# Patient Record
Sex: MALE | Race: WHITE | NOT HISPANIC OR LATINO | Employment: OTHER | URBAN - METROPOLITAN AREA
[De-identification: names, ages, dates, MRNs, and addresses within clinical notes are randomized per-mention and may not be internally consistent; named-entity substitution may affect disease eponyms.]

---

## 2021-02-24 ENCOUNTER — OFFICE VISIT (OUTPATIENT)
Dept: FAMILY MEDICINE | Facility: CLINIC | Age: 56
End: 2021-02-24
Payer: COMMERCIAL

## 2021-02-24 VITALS
WEIGHT: 222.6 LBS | HEIGHT: 71 IN | DIASTOLIC BLOOD PRESSURE: 102 MMHG | HEART RATE: 92 BPM | SYSTOLIC BLOOD PRESSURE: 164 MMHG | BODY MASS INDEX: 31.16 KG/M2 | TEMPERATURE: 98.6 F | OXYGEN SATURATION: 98 %

## 2021-02-24 DIAGNOSIS — M54.50 LUMBAR BACK PAIN: Primary | ICD-10-CM

## 2021-02-24 PROCEDURE — 99203 OFFICE O/P NEW LOW 30 MIN: CPT | Performed by: FAMILY MEDICINE

## 2021-02-24 ASSESSMENT — MIFFLIN-ST. JEOR: SCORE: 1871.84

## 2021-02-24 ASSESSMENT — PAIN SCALES - GENERAL: PAINLEVEL: SEVERE PAIN (6)

## 2021-02-24 NOTE — PATIENT INSTRUCTIONS
Our Clinic hours are:  Mondays    7:20 am - 7 pm  Tues -  Fri  7:20 am - 5 pm    Clinic Phone: [phone]    The clinic lab opens at 7:30 am Mon - Fri and appointments are required.    [facility]. [phone]  Monday  8 am - 7pm  Tues - Fri 8 am - 5:30 pm

## 2021-02-24 NOTE — PROGRESS NOTES
"    Assessment & Plan     Lumbar back pain  Given pathology and previous MRI and persistent/progression of symptoms we will repeat MRI for further evaluation and management.  Treatment recommendations will be dependent, in large part, on MRI results.  He is hoping to possibly get to disability for his back issues.  Discussed that in order to really pursue that further he would need to show maximal medical improvement and we are only just beginning early work-up for this.  - MR Lumbar Spine w/o Contrast; Future             Tobacco Cessation:   reports that he has been smoking cigarettes. He has been smoking about 1.50 packs per day. He has never used smokeless tobacco.      BMI:   Estimated body mass index is 31.05 kg/m  as calculated from the following:    Height as of this encounter: 1.803 m (5' 11\").    Weight as of this encounter: 101 kg (222 lb 9.6 oz).   Weight management plan: See AVS        No follow-ups on file.    Hernan Connell MD  [facility]    Keith Galan is a 55 year old who presents for the following health issues     HPI       Back Pain  Onset/Duration: years  Description:   Location of pain: middle of back bilateral  Character of pain: sharp, dull ache and constant  Pain radiation: none  New numbness or weakness in legs, not attributed to pain: no   Intensity: Currently 6/10  Progression of Symptoms: same  History:   Specific cause: none  Pain interferes with job: not applicable  History of back problems: Has RA in it  Any previous MRI or X-rays: Yes- at [place].  Date 3-4 years ago, ? Done at [facility]  Sees a specialist for back pain: No  Alleviating factors:   Improved by: none    Precipitating factors:  Worsened by: Bending  Therapies tried and outcome: none    Accompanying Signs & Symptoms:  Risk of Fracture: None  Risk of Cauda Equina: None  Risk of Infection: None  Risk of Cancer: None  Risk of Ankylosing Spondylitis: Onset at age <35, male, AND " "morning back stiffness  no             Review of Systems   Constitutional, neuro, ENT, endocrine, pulmonary, cardiac, gastrointestinal, genitourinary, musculoskeletal, integument and psychiatric systems are negative, except as otherwise noted.       Objective    BP (!) 164/102 (Cuff Size: Adult Regular)   Pulse 92   Temp 98.6  F (37  C) (Tympanic)   Ht 1.803 m (5' 11\")   Wt 101 kg (222 lb 9.6 oz)   SpO2 98%   BMI 31.05 kg/m    Body mass index is 31.05 kg/m .  Physical Exam   GENERAL: Pleasant, well appearing male.  MUSCULOSKELETAL:  mild midline vertebral tenderness to palpation lower thoracic and upper lumbar vertebra. Has bilateral paravertebral tenderness and tightness. Straight leg raise is negative for radicular symptoms. Strength is 5/5 and DTR 2+ and symmetric throughout lower extremities.     RADIOLOGY REPORT    TITLE:   MR SPINE LUMBAR WO    TECHNIQUE:  Lumbar spine MRI.  Total spine sagittal T2 survey imaging,   lumbar coronal T2, 3-D HASTE, sagittal T1, T2, STIR, axial T1, T2 images   obtained.    Clinical:  Low back pain exacerbated with bending.      Findings:  Visualized cerebellum and lower brainstem appear normal.    Cervical and thoracic cord contour and signal character normal.  Conus   normal extending to the T12 level.  Shallow C3-4 disc protrusion noted on   survey imaging.  There is vertebral body edema at the anterior inferior   T11, anterior T12 and anterior superior L1 endplates with T1 signal loss.    No evidence of cortical breaks in the vertebra.  Paraspinous soft tissues   appear normal.      T11-12:  Disc desiccation.  No significant bulge or herniation.    T12-L1:  Disc space preserved.  Mild facet arthropathy.    L1-2:  Preserved disc height and hydration with no bulge or protrusion.      L2-3:  Disc desiccation with height preserved.  Annular bulge.  No canal   or foraminal stenosis.      L3-4:  Facet arthropathy.  Disc preserved.      L4-5:  Facet arthropathy/hypertrophy. "  Grade 1 anterolisthesis.  Shallow   central noneffacing herniation.  No lateral recess or foraminal stenosis.    L5-S1:  Facet arthropathy mild in degree.  Disc desiccation with height   loss.  Shallow central and left eccentric noneffacing herniation.    Foraminal patency preserved.      Impression:    1.  Evidence of significant bone bruise and contusion injuries anterior   T11, T12, L1 vertebrae and adjacent superior and inferior endplates.    2.  L4-5 anterolisthesis due to facet arthropathy with shallow noneffacing   herniation.    3.  L5-S1 left paracentral noneffacing herniation with facet arthropathy.          Dictated by: Umesh Austin Dictated on: 09/07/2017 1519   Transcribed by:   ELIJAH    Transcribed on:  9/8/17 0907

## 2021-03-12 ENCOUNTER — HOSPITAL ENCOUNTER (OUTPATIENT)
Dept: MRI IMAGING | Facility: CLINIC | Age: 56
Discharge: HOME | End: 2021-03-12
Attending: FAMILY MEDICINE | Admitting: FAMILY MEDICINE
Payer: COMMERCIAL

## 2021-03-12 DIAGNOSIS — M54.50 LUMBAR BACK PAIN: ICD-10-CM

## 2021-03-12 PROCEDURE — 72148 MRI LUMBAR SPINE W/O DYE: CPT

## 2021-11-01 ENCOUNTER — OFFICE VISIT (OUTPATIENT)
Dept: FAMILY MEDICINE | Facility: CLINIC | Age: 56
End: 2021-11-01
Payer: COMMERCIAL

## 2021-11-01 ENCOUNTER — ANCILLARY PROCEDURE (OUTPATIENT)
Dept: GENERAL RADIOLOGY | Facility: CLINIC | Age: 56
End: 2021-11-01
Attending: NURSE PRACTITIONER
Payer: COMMERCIAL

## 2021-11-01 VITALS
RESPIRATION RATE: 16 BRPM | WEIGHT: 215.8 LBS | HEIGHT: 71 IN | TEMPERATURE: 98.4 F | SYSTOLIC BLOOD PRESSURE: 138 MMHG | DIASTOLIC BLOOD PRESSURE: 88 MMHG | HEART RATE: 90 BPM | BODY MASS INDEX: 30.21 KG/M2 | OXYGEN SATURATION: 100 %

## 2021-11-01 DIAGNOSIS — M87.039 AVASCULAR NECROSIS OF LUNATE (H): ICD-10-CM

## 2021-11-01 DIAGNOSIS — M47.819 SPONDYLOARTHROPATHY: ICD-10-CM

## 2021-11-01 DIAGNOSIS — Z12.11 SCREENING FOR MALIGNANT NEOPLASM OF COLON: ICD-10-CM

## 2021-11-01 DIAGNOSIS — M45.8 ANKYLOSING SPONDYLITIS OF SACRAL REGION (H): ICD-10-CM

## 2021-11-01 DIAGNOSIS — M25.431 WRIST SWELLING, RIGHT: Primary | ICD-10-CM

## 2021-11-01 DIAGNOSIS — M25.431 WRIST SWELLING, RIGHT: ICD-10-CM

## 2021-11-01 LAB
ANION GAP SERPL CALCULATED.3IONS-SCNC: 10 MMOL/L (ref 3–14)
BUN SERPL-MCNC: 4 MG/DL (ref 7–30)
CALCIUM SERPL-MCNC: 8.6 MG/DL (ref 8.5–10.1)
CHLORIDE BLD-SCNC: 102 MMOL/L (ref 94–109)
CO2 SERPL-SCNC: 24 MMOL/L (ref 20–32)
CREAT SERPL-MCNC: 0.66 MG/DL (ref 0.66–1.25)
CRP SERPL-MCNC: <2.9 MG/L (ref 0–8)
ERYTHROCYTE [SEDIMENTATION RATE] IN BLOOD BY WESTERGREN METHOD: 23 MM/HR (ref 0–20)
GFR SERPL CREATININE-BSD FRML MDRD: >90 ML/MIN/1.73M2
GLUCOSE BLD-MCNC: 109 MG/DL (ref 70–99)
POTASSIUM BLD-SCNC: 4.4 MMOL/L (ref 3.4–5.3)
SODIUM SERPL-SCNC: 136 MMOL/L (ref 133–144)
TSH SERPL DL<=0.005 MIU/L-ACNC: 3.61 MU/L (ref 0.4–4)
URATE SERPL-MCNC: 4.9 MG/DL (ref 3.5–7.2)

## 2021-11-01 PROCEDURE — 86200 CCP ANTIBODY: CPT | Performed by: NURSE PRACTITIONER

## 2021-11-01 PROCEDURE — 84443 ASSAY THYROID STIM HORMONE: CPT | Performed by: NURSE PRACTITIONER

## 2021-11-01 PROCEDURE — 86140 C-REACTIVE PROTEIN: CPT | Performed by: NURSE PRACTITIONER

## 2021-11-01 PROCEDURE — 73110 X-RAY EXAM OF WRIST: CPT | Mod: RT | Performed by: RADIOLOGY

## 2021-11-01 PROCEDURE — 80048 BASIC METABOLIC PNL TOTAL CA: CPT | Performed by: NURSE PRACTITIONER

## 2021-11-01 PROCEDURE — 86618 LYME DISEASE ANTIBODY: CPT | Performed by: NURSE PRACTITIONER

## 2021-11-01 PROCEDURE — 36415 COLL VENOUS BLD VENIPUNCTURE: CPT | Performed by: NURSE PRACTITIONER

## 2021-11-01 PROCEDURE — 84550 ASSAY OF BLOOD/URIC ACID: CPT | Performed by: NURSE PRACTITIONER

## 2021-11-01 PROCEDURE — 99214 OFFICE O/P EST MOD 30 MIN: CPT | Performed by: NURSE PRACTITIONER

## 2021-11-01 PROCEDURE — 86431 RHEUMATOID FACTOR QUANT: CPT | Performed by: NURSE PRACTITIONER

## 2021-11-01 PROCEDURE — 85652 RBC SED RATE AUTOMATED: CPT | Performed by: NURSE PRACTITIONER

## 2021-11-01 ASSESSMENT — MIFFLIN-ST. JEOR: SCORE: 1835.99

## 2021-11-01 NOTE — PATIENT INSTRUCTIONS
Patient Education     Ankylosing Spondylitis in Adults  Arthritis is a disease that affects joints in your body. Ankylosing spondylitis (AS) is a type of arthritis that attacks the spine.   What causes AS?  Healthcare providers don t know exactly what causes AS. Genes may play a role. Many people with AS have a gene called HLA-B27. But only a few people with this gene actually get AS.  Young and old people can get AS. But it s more common in people ages 17 to 35. Men are more likely than women to have AS. You are also more likely to have it if someone else in your family had it.  Symptoms  AS causes pain and stiffness. The spine and nearby joints such as the hip become irritated (inflamed). The disease may break down the joints in serious cases. Bones may even fuse together.  Symptoms of AS may come and go. They often include:    Back pain. This often happens in the morning when waking up.    Body aches, such as in the legs, shoulders, buttocks, or heels    Stiffness in the morning    Stooped posture to ease pain    Problems breathing in deeply. This happens if AS affects the joints between the ribs and the spine.    Lack of appetite    Feeling very tired (fatigue)    Fever    Low red blood cell count (anemia)  Some people with AS also have skin rashes and stomach illnesses. They may also have eye problems. These include pain, redness, and sensitivity to light. Severe cases of the disease may damage the heart and lungs. About half of people with AS get weak and brittle bones (osteoporosis).   Diagnosing AS  To diagnose AS, your healthcare provider will start with a physical exam. He or she will ask about your symptoms and health history. X-rays of your spine and other joints may show joint damage. You may also have an MRI. Genetic testing can find out if you have the HLA-B27 gene.  Your healthcare provider may also recommend a lab test that checks for inflammation. An erythrocyte sedimentation rate test measures  how quickly red blood cells fall to the bottom of a test tube. The red blood cells will clump together and fall faster if you have inflammation from arthritis.  Treating AS  AS can t be cured. But treatments can ease pain and stiffness. They can help you live a more active life. Your healthcare provider will choose the best treatment based on your overall health and how severe the disease is.  Several types of medicine can reduce pain and inflammation. These medicines include:    Nonsteroidal anti-inflammatory drugs (NSAIDs) such as naproxen or ibuprofen    Corticosteroids. These can be injected into affected joints and areas.     Muscle relaxants    Biologic medicines    Disease-modifying antirheumatic drugs (DMARDs)  Alternative treatments may also help. Talk with your healthcare provider about acupuncture, massage, yoga, and TENS units (transcutaneous electrical nerve stimulation).  Quitting smoking may help.   Your healthcare provider may also advise surgery. For instance, you may need to have a joint replaced. Other procedures remove damaged bone or insert rods into the spine.   Exercising regularly can help ease your symptoms. Be sure to include activities that strengthen the back and increase flexibility and range of motion. Your healthcare provider may also suggest physical therapy. Maintaining correct posture is also important.  [employer] last reviewed this educational content on 6/1/2018 2000-2021 [employer]. All rights reserved. This information is not intended as a substitute for professional medical care. Always follow your healthcare professional's instructions.           Patient Education     Arthralgia    Arthralgia is the term for pain in or around the joint. It is a symptom, not a disease. This pain may involve one or more joints. In some cases, the pain moves from joint to joint.  There are many causes for joint pain. These include:    Injury    Wearing out the joint surface  (osteoarthritis)    Inflammation of the joint because of crystals in the joint fluid (gout)    Infection inside the joint      Inflammation of the fluid-filled sacs around the joint (bursitis)    Autoimmune disorders such as rheumatoid arthritis or lupus    Inflammation of chords that attach muscle to bone (tendonitis)  Home care    Rest the involved joint(s) until your symptoms improve.     Eat a healthy diet, exercise as advised by your healthcare provider and stay at a healthy weight    You may be prescribed pain medicine. If none is prescribed, you may use acetaminophen or ibuprofen to control pain and inflammation.    Follow-up care  Follow up with your healthcare provider or as advised.  When to seek medical advice  Call your healthcare provider right away if any of the following occurs:    Pain, swelling, or redness of joint increases    Pain worsens or recurs after a period of improvement    Pain moves to other joints    You cannot bear weight on the affected joint     You cannot move the affected joint    Joint appears deformed    New rash appears    Fever of 100.4 F (38 C) or higher, or as directed by your healthcare provider    New symptoms appear  Bony last reviewed this educational content on 8/1/2019 2000-2021 [employer]. All rights reserved. This information is not intended as a substitute for professional medical care. Always follow your healthcare professional's instructions.

## 2021-11-01 NOTE — PROGRESS NOTES
"  Assessment & Plan     Wrist swelling, right  ? Gout vs possible RA vs osteoarthritis  - XR Wrist Right G/E 3 Views; Future  - CRP inflammation; Future  - Cyclic Citrullinated Peptide Antibody IgG; Future  - Erythrocyte sedimentation rate auto; Future  - Lyme Disease [place] with reflex to WB Serum; Future  - Rheumatoid factor; Future  - TSH with free T4 reflex; Future  - Uric acid; Future  - Basic metabolic panel  (Ca, Cl, CO2, Creat, Gluc, K, Na, BUN); Future    Ankylosing spondylitis of sacral region (H)  Consult pain mgmt and Rheumatology  - Pain Management Referral; Future  - Rheumatology Referral; Future  - CRP inflammation; Future  - Rheumatology Referral; Future  - Basic metabolic panel  (Ca, Cl, CO2, Creat, Gluc, K, Na, BUN); Future    Screening for malignant neoplasm of colon    - Adult Gastro Ref - Procedure Only; Future    Spondyloarthropathy    - piroxicam (FELDENE) 20 MG capsule; Take 1 capsule (20 mg) by mouth daily (with breakfast)             Tobacco Cessation:   reports that he has been smoking cigarettes. He has been smoking about 1.50 packs per day. He has never used smokeless tobacco.      BMI:   Estimated body mass index is 30.1 kg/m  as calculated from the following:    Height as of this encounter: 1.803 m (5' 11\").    Weight as of this encounter: 97.9 kg (215 lb 12.8 oz).       CONSULTATION/REFERRAL to PAIN, Rheumatology    FUTURE APPOINTMENTS:       - Follow-up visit in case of new or worsening symptoms.     Patient Instructions     Patient Education     Ankylosing Spondylitis in Adults  Arthritis is a disease that affects joints in your body. Ankylosing spondylitis (AS) is a type of arthritis that attacks the spine.   What causes AS?  Healthcare providers don t know exactly what causes AS. Genes may play a role. Many people with AS have a gene called HLA-B27. But only a few people with this gene actually get AS.  Young and old people can get AS. But it s more common in people ages 17 to 35. " Men are more likely than women to have AS. You are also more likely to have it if someone else in your family had it.  Symptoms  AS causes pain and stiffness. The spine and nearby joints such as the hip become irritated (inflamed). The disease may break down the joints in serious cases. Bones may even fuse together.  Symptoms of AS may come and go. They often include:    Back pain. This often happens in the morning when waking up.    Body aches, such as in the legs, shoulders, buttocks, or heels    Stiffness in the morning    Stooped posture to ease pain    Problems breathing in deeply. This happens if AS affects the joints between the ribs and the spine.    Lack of appetite    Feeling very tired (fatigue)    Fever    Low red blood cell count (anemia)  Some people with AS also have skin rashes and stomach illnesses. They may also have eye problems. These include pain, redness, and sensitivity to light. Severe cases of the disease may damage the heart and lungs. About half of people with AS get weak and brittle bones (osteoporosis).   Diagnosing AS  To diagnose AS, your healthcare provider will start with a physical exam. He or she will ask about your symptoms and health history. X-rays of your spine and other joints may show joint damage. You may also have an MRI. Genetic testing can find out if you have the HLA-B27 gene.  Your healthcare provider may also recommend a lab test that checks for inflammation. An erythrocyte sedimentation rate test measures how quickly red blood cells fall to the bottom of a test tube. The red blood cells will clump together and fall faster if you have inflammation from arthritis.  Treating AS  AS can t be cured. But treatments can ease pain and stiffness. They can help you live a more active life. Your healthcare provider will choose the best treatment based on your overall health and how severe the disease is.  Several types of medicine can reduce pain and inflammation. These  medicines include:    Nonsteroidal anti-inflammatory drugs (NSAIDs) such as naproxen or ibuprofen    Corticosteroids. These can be injected into affected joints and areas.     Muscle relaxants    Biologic medicines    Disease-modifying antirheumatic drugs (DMARDs)  Alternative treatments may also help. Talk with your healthcare provider about acupuncture, massage, yoga, and TENS units (transcutaneous electrical nerve stimulation).  Quitting smoking may help.   Your healthcare provider may also advise surgery. For instance, you may need to have a joint replaced. Other procedures remove damaged bone or insert rods into the spine.   Exercising regularly can help ease your symptoms. Be sure to include activities that strengthen the back and increase flexibility and range of motion. Your healthcare provider may also suggest physical therapy. Maintaining correct posture is also important.  [employer] last reviewed this educational content on 6/1/2018 2000-2021 [employer]. All rights reserved. This information is not intended as a substitute for professional medical care. Always follow your healthcare professional's instructions.           Patient Education     Arthralgia    Arthralgia is the term for pain in or around the joint. It is a symptom, not a disease. This pain may involve one or more joints. In some cases, the pain moves from joint to joint.  There are many causes for joint pain. These include:    Injury    Wearing out the joint surface (osteoarthritis)    Inflammation of the joint because of crystals in the joint fluid (gout)    Infection inside the joint      Inflammation of the fluid-filled sacs around the joint (bursitis)    Autoimmune disorders such as rheumatoid arthritis or lupus    Inflammation of chords that attach muscle to bone (tendonitis)  Home care    Rest the involved joint(s) until your symptoms improve.     Eat a healthy diet, exercise as advised by your healthcare provider and  stay at a healthy weight    You may be prescribed pain medicine. If none is prescribed, you may use acetaminophen or ibuprofen to control pain and inflammation.    Follow-up care  Follow up with your healthcare provider or as advised.  When to seek medical advice  Call your healthcare provider right away if any of the following occurs:    Pain, swelling, or redness of joint increases    Pain worsens or recurs after a period of improvement    Pain moves to other joints    You cannot bear weight on the affected joint     You cannot move the affected joint    Joint appears deformed    New rash appears    Fever of 100.4 F (38 C) or higher, or as directed by your healthcare provider    New symptoms appear  B[employer] last reviewed this educational content on 8/1/2019 2000-2021 [employer]. All rights reserved. This information is not intended as a substitute for professional medical care. Always follow your healthcare professional's instructions.               No follow-ups on file.    LILLIAM Swartz CNP  [facility]    Keith Galan is a 56 year old who presents for the following health issues     HPI     Musculoskeletal problem/pain  Onset/Duration: 4 years   Description  Location: wrist - right  Joint Swelling: YES  Redness: no  Pain: YES- aches   Warmth: no  Intensity:  mild  Progression of Symptoms:  same  Accompanying signs and symptoms:   Fevers: no  Numbness/tingling/weakness: no  History  Trauma to the area: YES- stripped a floor with carpal tunnel about 4 years ago, pain ever since   Recent illness:  no  Previous similar problem: no  Previous evaluation:  YES- Hx of ankylosing spondylitis- seen on MRI scans and also HLA- B27 positive test. Has been evaluated by Rheumatology but cannot afford the medications they wanted to put him on.   Precipitating or alleviating factors:  Aggravating factors include: lifting and overuse  Therapies tried and outcome:  "nothing    Back Pain  Onset/Duration: dx with ankylosing spondylitis about 10 years ago   Description:   Location of pain: middle of back center  Character of pain: dull ache  Pain radiation: none  New numbness or weakness in legs, not attributed to pain: no   Intensity: moderate  Progression of Symptoms: worsening  History:   Specific cause: none  Pain interferes with job: not applicable  History of back problems: previous herniated disc  Any previous MRI or X-rays: Yes- at [place].  Date 3/12/21  Sees a specialist for back pain: No  Hx of ankylosing spondylitis- seen on MRI scans and also HLA- B27 positive test. Has been evaluated by Rheumatology but cannot afford the medications they wanted to put him on.   Alleviating factors:   Improved by: none    Precipitating factors:  Worsened by: Lifting, Bending, Sitting and climbing stairs   Therapies tried and outcome: aleve     Accompanying Signs & Symptoms:  Risk of Fracture: None  Risk of Cauda Equina: None  Risk of Infection: None  Risk of Cancer: None  Risk of Ankylosing Spondylitis: Onset at age <35, male, AND morning back stiffness YES    [id number]}    Review of Systems   Constitutional, HEENT, cardiovascular, pulmonary, gi and gu systems are negative, except as otherwise noted.      Objective    /88   Pulse 90   Temp 98.4  F (36.9  C) (Tympanic)   Resp 16   Ht 1.803 m (5' 11\")   Wt 97.9 kg (215 lb 12.8 oz)   SpO2 100%   BMI 30.10 kg/m    Body mass index is 30.1 kg/m .  Physical Exam   GENERAL: alert and no distress  RESP: no rales  and no rhonchi  CV: regular rates and rhythm, normal S1 S2, no S3 or S4 and no murmur, click or rub  MS: LUE exam shows normal strength and muscle mass, no deformities, no erythema, induration, or nodules, no evidence of joint effusion, ROM of all joints is normal and mild edema and erythema to right wrist- no warmth or lesions.  SKIN: no suspicious lesions or rashes  NEURO: Normal strength and tone, mentation intact and " speech normal    Xray - Reviewed and interpreted by me.  Healed old fracture, some DJD of wrist

## 2021-11-02 LAB
B BURGDOR IGG+IGM SER QL: 0.02
CCP AB SER IA-ACNC: 2.3 U/ML
RHEUMATOID FACT SER NEPH-ACNC: 11 IU/ML

## 2021-11-08 ENCOUNTER — TELEPHONE (OUTPATIENT)
Dept: RHEUMATOLOGY | Facility: CLINIC | Age: 56
End: 2021-11-08
Payer: COMMERCIAL

## 2021-11-08 NOTE — TELEPHONE ENCOUNTER
Health Call Center    Phone Message    May a detailed message be left on voicemail: no; No phone number listed in chart, Pt'spouse states she will call back to update chart with the phone number in chart.    Reason for Call: Appointment Intake    Referring Provider Name: Kimberly Marshall APRN CNP in  FAMILY PRACTICE    Diagnosis and/or Symptoms: Ankylosing spondylitis of sacral region (H) [M45.8]    Pt was referred to be seen by Dr. Lang.  Please review and advise if okay to be schedule with Dr. Lang?    Action Taken: Message routed to:  [facility] Adult Rheumatology

## 2021-11-09 NOTE — TELEPHONE ENCOUNTER
Pt was scheduled with Dr. Rodriguez for 2/10/22.     Please disregard the message for Dr. Lang's team to review and advise.

## 2022-03-11 ENCOUNTER — TELEPHONE (OUTPATIENT)
Dept: FAMILY MEDICINE | Facility: CLINIC | Age: 57
End: 2022-03-11
Payer: COMMERCIAL

## 2022-03-11 NOTE — TELEPHONE ENCOUNTER
Patient Quality Outreach    Patient is due for the following:   Colon Cancer Screening -  FIT, Colonoscopy and Cologuard    NEXT STEPS:   patient to schedule colon screening    Type of outreach:    Phone, left message for patient/parent to call back.      Questions for provider review:    None     Clara Ching MA

## 2024-03-26 ENCOUNTER — APPOINTMENT (OUTPATIENT)
Dept: CT IMAGING | Facility: CLINIC | Age: 59
End: 2024-03-26
Attending: EMERGENCY MEDICINE
Payer: COMMERCIAL

## 2024-03-26 ENCOUNTER — APPOINTMENT (OUTPATIENT)
Dept: MRI IMAGING | Facility: CLINIC | Age: 59
End: 2024-03-26
Attending: EMERGENCY MEDICINE
Payer: COMMERCIAL

## 2024-03-26 ENCOUNTER — HOSPITAL ENCOUNTER (EMERGENCY)
Facility: CLINIC | Age: 59
Discharge: ANOTHER HOSPITAL | End: 2024-03-27
Attending: EMERGENCY MEDICINE | Admitting: EMERGENCY MEDICINE
Payer: COMMERCIAL

## 2024-03-26 DIAGNOSIS — S09.90XA CLOSED HEAD INJURY, INITIAL ENCOUNTER: ICD-10-CM

## 2024-03-26 DIAGNOSIS — S01.01XA SCALP LACERATION, INITIAL ENCOUNTER: ICD-10-CM

## 2024-03-26 DIAGNOSIS — E87.1 HYPONATREMIA: ICD-10-CM

## 2024-03-26 DIAGNOSIS — R56.9 SEIZURE-LIKE ACTIVITY (H): Primary | ICD-10-CM

## 2024-03-26 PROBLEM — I10 HYPERTENSION: Status: ACTIVE | Noted: 2017-06-06

## 2024-03-26 PROBLEM — G56.03 BILATERAL CARPAL TUNNEL SYNDROME: Status: ACTIVE | Noted: 2017-05-01

## 2024-03-26 LAB
ALBUMIN SERPL BCG-MCNC: 3.7 G/DL (ref 3.5–5.2)
ALP SERPL-CCNC: 129 U/L (ref 40–150)
ALT SERPL W P-5'-P-CCNC: 27 U/L (ref 0–70)
ANION GAP SERPL CALCULATED.3IONS-SCNC: 18 MMOL/L (ref 7–15)
AST SERPL W P-5'-P-CCNC: 37 U/L (ref 0–45)
BASOPHILS # BLD MANUAL: 0.1 10E3/UL (ref 0–0.2)
BASOPHILS NFR BLD MANUAL: 1 %
BILIRUB SERPL-MCNC: 0.5 MG/DL
BUN SERPL-MCNC: 5.6 MG/DL (ref 6–20)
CALCIUM SERPL-MCNC: 8.6 MG/DL (ref 8.6–10)
CHLORIDE SERPL-SCNC: 92 MMOL/L (ref 98–107)
CREAT SERPL-MCNC: 0.76 MG/DL (ref 0.67–1.17)
DEPRECATED HCO3 PLAS-SCNC: 20 MMOL/L (ref 22–29)
EGFRCR SERPLBLD CKD-EPI 2021: >90 ML/MIN/1.73M2
EOSINOPHIL # BLD MANUAL: 0 10E3/UL (ref 0–0.7)
EOSINOPHIL NFR BLD MANUAL: 0 %
ERYTHROCYTE [DISTWIDTH] IN BLOOD BY AUTOMATED COUNT: 15.9 % (ref 10–15)
ETHANOL SERPL-MCNC: <0.01 G/DL
GLUCOSE SERPL-MCNC: 144 MG/DL (ref 70–99)
HCT VFR BLD AUTO: 46.8 % (ref 40–53)
HGB BLD-MCNC: 15.9 G/DL (ref 13.3–17.7)
HOLD SPECIMEN: NORMAL
LACTATE SERPL-SCNC: 1.9 MMOL/L (ref 0.7–2)
LACTATE SERPL-SCNC: 4.8 MMOL/L (ref 0.7–2)
LYMPHOCYTES # BLD MANUAL: 1 10E3/UL (ref 0.8–5.3)
LYMPHOCYTES NFR BLD MANUAL: 14 %
MAGNESIUM SERPL-MCNC: 2.2 MG/DL (ref 1.7–2.3)
MAGNESIUM SERPL-MCNC: 2.4 MG/DL (ref 1.7–2.3)
MCH RBC QN AUTO: 29.3 PG (ref 26.5–33)
MCHC RBC AUTO-ENTMCNC: 34 G/DL (ref 31.5–36.5)
MCV RBC AUTO: 86 FL (ref 78–100)
MONOCYTES # BLD MANUAL: 0.6 10E3/UL (ref 0–1.3)
MONOCYTES NFR BLD MANUAL: 8 %
NEUTROPHILS # BLD MANUAL: 5.7 10E3/UL (ref 1.6–8.3)
NEUTROPHILS NFR BLD MANUAL: 77 %
NRBC # BLD AUTO: 0 10E3/UL
NRBC BLD AUTO-RTO: 0 /100
PHOSPHATE SERPL-MCNC: 2.2 MG/DL (ref 2.5–4.5)
PLAT MORPH BLD: NORMAL
PLATELET # BLD AUTO: 217 10E3/UL (ref 150–450)
POTASSIUM SERPL-SCNC: 3.6 MMOL/L (ref 3.4–5.3)
PROT SERPL-MCNC: 8.1 G/DL (ref 6.4–8.3)
RBC # BLD AUTO: 5.43 10E6/UL (ref 4.4–5.9)
RBC MORPH BLD: NORMAL
SODIUM SERPL-SCNC: 130 MMOL/L (ref 135–145)
TROPONIN T SERPL HS-MCNC: 12 NG/L
WBC # BLD AUTO: 7.4 10E3/UL (ref 4–11)

## 2024-03-26 PROCEDURE — 99285 EMERGENCY DEPT VISIT HI MDM: CPT | Mod: 25

## 2024-03-26 PROCEDURE — 12001 RPR S/N/AX/GEN/TRNK 2.5CM/<: CPT | Performed by: EMERGENCY MEDICINE

## 2024-03-26 PROCEDURE — 258N000003 HC RX IP 258 OP 636: Performed by: EMERGENCY MEDICINE

## 2024-03-26 PROCEDURE — 83735 ASSAY OF MAGNESIUM: CPT | Mod: 91 | Performed by: EMERGENCY MEDICINE

## 2024-03-26 PROCEDURE — 82077 ASSAY SPEC XCP UR&BREATH IA: CPT | Performed by: EMERGENCY MEDICINE

## 2024-03-26 PROCEDURE — 99285 EMERGENCY DEPT VISIT HI MDM: CPT | Mod: 25 | Performed by: EMERGENCY MEDICINE

## 2024-03-26 PROCEDURE — 250N000013 HC RX MED GY IP 250 OP 250 PS 637: Performed by: EMERGENCY MEDICINE

## 2024-03-26 PROCEDURE — 84484 ASSAY OF TROPONIN QUANT: CPT | Performed by: EMERGENCY MEDICINE

## 2024-03-26 PROCEDURE — A9585 GADOBUTROL INJECTION: HCPCS | Performed by: EMERGENCY MEDICINE

## 2024-03-26 PROCEDURE — 255N000002 HC RX 255 OP 636: Performed by: EMERGENCY MEDICINE

## 2024-03-26 PROCEDURE — 85049 AUTOMATED PLATELET COUNT: CPT | Performed by: EMERGENCY MEDICINE

## 2024-03-26 PROCEDURE — 70450 CT HEAD/BRAIN W/O DYE: CPT

## 2024-03-26 PROCEDURE — 96361 HYDRATE IV INFUSION ADD-ON: CPT | Mod: 59 | Performed by: EMERGENCY MEDICINE

## 2024-03-26 PROCEDURE — 84100 ASSAY OF PHOSPHORUS: CPT | Performed by: EMERGENCY MEDICINE

## 2024-03-26 PROCEDURE — 36415 COLL VENOUS BLD VENIPUNCTURE: CPT | Performed by: EMERGENCY MEDICINE

## 2024-03-26 PROCEDURE — 93010 ELECTROCARDIOGRAM REPORT: CPT | Performed by: EMERGENCY MEDICINE

## 2024-03-26 PROCEDURE — 96360 HYDRATION IV INFUSION INIT: CPT | Mod: 59 | Performed by: EMERGENCY MEDICINE

## 2024-03-26 PROCEDURE — 72125 CT NECK SPINE W/O DYE: CPT

## 2024-03-26 PROCEDURE — 85007 BL SMEAR W/DIFF WBC COUNT: CPT | Performed by: EMERGENCY MEDICINE

## 2024-03-26 PROCEDURE — 83735 ASSAY OF MAGNESIUM: CPT | Performed by: EMERGENCY MEDICINE

## 2024-03-26 PROCEDURE — 12001 RPR S/N/AX/GEN/TRNK 2.5CM/<: CPT

## 2024-03-26 PROCEDURE — 70553 MRI BRAIN STEM W/O & W/DYE: CPT

## 2024-03-26 PROCEDURE — 93005 ELECTROCARDIOGRAM TRACING: CPT | Mod: XU | Performed by: EMERGENCY MEDICINE

## 2024-03-26 PROCEDURE — 83605 ASSAY OF LACTIC ACID: CPT | Mod: 91 | Performed by: EMERGENCY MEDICINE

## 2024-03-26 PROCEDURE — 80053 COMPREHEN METABOLIC PANEL: CPT | Performed by: EMERGENCY MEDICINE

## 2024-03-26 RX ORDER — LEVETIRACETAM 500 MG/1
500 TABLET ORAL 2 TIMES DAILY
Qty: 60 TABLET | Refills: 0 | Status: ON HOLD | OUTPATIENT
Start: 2024-03-26 | End: 2024-03-27

## 2024-03-26 RX ORDER — GABAPENTIN 300 MG/1
600 CAPSULE ORAL EVERY 8 HOURS
Status: DISCONTINUED | OUTPATIENT
Start: 2024-03-30 | End: 2024-03-27 | Stop reason: HOSPADM

## 2024-03-26 RX ORDER — OLANZAPINE 5 MG/1
5-10 TABLET, ORALLY DISINTEGRATING ORAL EVERY 6 HOURS PRN
Status: DISCONTINUED | OUTPATIENT
Start: 2024-03-26 | End: 2024-03-27 | Stop reason: HOSPADM

## 2024-03-26 RX ORDER — IBUPROFEN 600 MG/1
600 TABLET, FILM COATED ORAL ONCE
Status: COMPLETED | OUTPATIENT
Start: 2024-03-26 | End: 2024-03-26

## 2024-03-26 RX ORDER — GADOBUTROL 604.72 MG/ML
9 INJECTION INTRAVENOUS ONCE
Status: COMPLETED | OUTPATIENT
Start: 2024-03-26 | End: 2024-03-26

## 2024-03-26 RX ORDER — FOLIC ACID 1 MG/1
1 TABLET ORAL DAILY
Status: DISCONTINUED | OUTPATIENT
Start: 2024-03-27 | End: 2024-03-26

## 2024-03-26 RX ORDER — GABAPENTIN 300 MG/1
300 CAPSULE ORAL EVERY 8 HOURS
Status: DISCONTINUED | OUTPATIENT
Start: 2024-04-01 | End: 2024-03-27 | Stop reason: HOSPADM

## 2024-03-26 RX ORDER — GABAPENTIN 300 MG/1
900 CAPSULE ORAL EVERY 8 HOURS
Status: DISCONTINUED | OUTPATIENT
Start: 2024-03-27 | End: 2024-03-27 | Stop reason: HOSPADM

## 2024-03-26 RX ORDER — GABAPENTIN 600 MG/1
1200 TABLET ORAL ONCE
Status: COMPLETED | OUTPATIENT
Start: 2024-03-26 | End: 2024-03-26

## 2024-03-26 RX ORDER — FOLIC ACID 1 MG/1
1 TABLET ORAL DAILY
Status: DISCONTINUED | OUTPATIENT
Start: 2024-03-26 | End: 2024-03-27 | Stop reason: HOSPADM

## 2024-03-26 RX ORDER — FLUMAZENIL 0.1 MG/ML
0.2 INJECTION, SOLUTION INTRAVENOUS
Status: DISCONTINUED | OUTPATIENT
Start: 2024-03-26 | End: 2024-03-27 | Stop reason: HOSPADM

## 2024-03-26 RX ORDER — MULTIVITAMIN,THERAPEUTIC
1 TABLET ORAL DAILY
Status: DISCONTINUED | OUTPATIENT
Start: 2024-03-26 | End: 2024-03-27 | Stop reason: HOSPADM

## 2024-03-26 RX ORDER — GABAPENTIN 100 MG/1
100 CAPSULE ORAL EVERY 8 HOURS
Status: DISCONTINUED | OUTPATIENT
Start: 2024-04-03 | End: 2024-03-27 | Stop reason: HOSPADM

## 2024-03-26 RX ORDER — CLONIDINE HYDROCHLORIDE 0.1 MG/1
0.1 TABLET ORAL EVERY 8 HOURS
Status: DISCONTINUED | OUTPATIENT
Start: 2024-03-26 | End: 2024-03-27 | Stop reason: HOSPADM

## 2024-03-26 RX ORDER — LORAZEPAM 2 MG/ML
1-2 INJECTION INTRAMUSCULAR EVERY 30 MIN PRN
Status: DISCONTINUED | OUTPATIENT
Start: 2024-03-26 | End: 2024-03-27 | Stop reason: HOSPADM

## 2024-03-26 RX ORDER — MULTIPLE VITAMINS W/ MINERALS TAB 9MG-400MCG
1 TAB ORAL DAILY
Status: DISCONTINUED | OUTPATIENT
Start: 2024-03-27 | End: 2024-03-26

## 2024-03-26 RX ORDER — LORAZEPAM 1 MG/1
1-2 TABLET ORAL EVERY 30 MIN PRN
Status: DISCONTINUED | OUTPATIENT
Start: 2024-03-26 | End: 2024-03-27 | Stop reason: HOSPADM

## 2024-03-26 RX ORDER — HALOPERIDOL 5 MG/ML
2.5-5 INJECTION INTRAMUSCULAR EVERY 6 HOURS PRN
Status: DISCONTINUED | OUTPATIENT
Start: 2024-03-26 | End: 2024-03-27 | Stop reason: HOSPADM

## 2024-03-26 RX ADMIN — CLONIDINE HYDROCHLORIDE 0.1 MG: 0.1 TABLET ORAL at 23:23

## 2024-03-26 RX ADMIN — GABAPENTIN 1200 MG: 600 TABLET, FILM COATED ORAL at 23:24

## 2024-03-26 RX ADMIN — THIAMINE HCL TAB 100 MG 100 MG: 100 TAB at 23:24

## 2024-03-26 RX ADMIN — IBUPROFEN 600 MG: 600 TABLET ORAL at 20:46

## 2024-03-26 RX ADMIN — SODIUM CHLORIDE, POTASSIUM CHLORIDE, SODIUM LACTATE AND CALCIUM CHLORIDE 1000 ML: 600; 310; 30; 20 INJECTION, SOLUTION INTRAVENOUS at 15:04

## 2024-03-26 RX ADMIN — THERA TABS 1 TABLET: TAB at 23:25

## 2024-03-26 RX ADMIN — LORAZEPAM 1 MG: 1 TABLET ORAL at 23:23

## 2024-03-26 RX ADMIN — FOLIC ACID 1 MG: 1 TABLET ORAL at 23:24

## 2024-03-26 RX ADMIN — GADOBUTROL 9 ML: 604.72 INJECTION INTRAVENOUS at 18:12

## 2024-03-26 ASSESSMENT — LIFESTYLE VARIABLES
ANXIETY: MODERATELY ANXIOUS, OR GUARDED, SO ANXIETY IS INFERRED
HEADACHE, FULLNESS IN HEAD: NOT PRESENT
HEADACHE, FULLNESS IN HEAD: NOT PRESENT
AGITATION: MODERATELY FIDGETY AND RESTLESS
ORIENTATION AND CLOUDING OF SENSORIUM: ORIENTED AND CAN DO SERIAL ADDITIONS
NAUSEA AND VOMITING: NO NAUSEA AND NO VOMITING
TREMOR: 3
ANXIETY: MODERATELY ANXIOUS, OR GUARDED, SO ANXIETY IS INFERRED
PAROXYSMAL SWEATS: NO SWEAT VISIBLE
AUDITORY DISTURBANCES: NOT PRESENT
VISUAL DISTURBANCES: NOT PRESENT
AUDITORY DISTURBANCES: NOT PRESENT
AGITATION: MODERATELY FIDGETY AND RESTLESS
TOTAL SCORE: 11
TREMOR: 3
VISUAL DISTURBANCES: NOT PRESENT
ORIENTATION AND CLOUDING OF SENSORIUM: ORIENTED AND CAN DO SERIAL ADDITIONS
NAUSEA AND VOMITING: NO NAUSEA AND NO VOMITING
PAROXYSMAL SWEATS: NO SWEAT VISIBLE
TOTAL SCORE: 11

## 2024-03-26 ASSESSMENT — ACTIVITIES OF DAILY LIVING (ADL)
ADLS_ACUITY_SCORE: 35

## 2024-03-26 ASSESSMENT — COLUMBIA-SUICIDE SEVERITY RATING SCALE - C-SSRS
6. HAVE YOU EVER DONE ANYTHING, STARTED TO DO ANYTHING, OR PREPARED TO DO ANYTHING TO END YOUR LIFE?: NO
1. IN THE PAST MONTH, HAVE YOU WISHED YOU WERE DEAD OR WISHED YOU COULD GO TO SLEEP AND NOT WAKE UP?: NO
2. HAVE YOU ACTUALLY HAD ANY THOUGHTS OF KILLING YOURSELF IN THE PAST MONTH?: NO

## 2024-03-26 NOTE — DISCHARGE INSTRUCTIONS
You will need to schedule your EEG and follow up with Neurology.    No driving for three months and no activities that would be dangerous if you have a seizure as we discussed.    If you have another seizure, call 911 and come to the Emergency Department.     Staples in your head will need to be removed in 10 days.     Please keep the wound clean and dry for 24-48 hours.  After 24 hours, the dressing may be removed and the wound may be gently cleaned with warm water and soap.  You may apply petroleum based ointment as desired.      Any time the skin is damaged, scar formation is unavoidable. You can minimize the scar by following the above instructions and preventing infection. The scar will continue to evolve for at least 1 year. Final appearance of the scar will not be known until at least that time. Please apply sun screen to the scar before any sun exposure for the first year as sunburn or even tanning may cause the scar to become discolored and lead to poor cosmetic outcomes. If after 1 year, you are unhappy with the appearance of the scar you should seek follow-up with the appropriate health care professional to discuss further options.    You should return to the emergency department or your primary care physician immediately if you develop warmth, redness, swelling, drainage from the wound, or have fevers.    1) if you have recurrence of seizure events you should return to the department to be reevaluated.  You have elected to have a prescription for Keppra to consider if you take this medication after discussion about the recommendation by the consulting neurologist.  See hand out provided for common severe side effects with taking Keppra.  We also discussed concern about alcohol use and its contribution or correlation to seizure event today.
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
## 2024-03-26 NOTE — ED PROVIDER NOTES
Emergency Department Patient Sign-out       Brief HPI:  This is a 58 year old male signed out to me by Dr.R Peraza at shift change at 6AM. See Dr Peraza's ED note for further details prior to shift change and handoff. In brief by report at handoff this is a 58-year-old male with a history of alcohol use but no definite diagnosis of alcohol use disorder who presented after witnessed seizure event.  Patient was heard to have fallen after a loud thud.  He was witnessed to be seizing.  He has returned to baseline. Workup thus far suggestive of new onset seizure with elevated lactic acid now normalized and closed head injury.  Initial head CT with no acute findings. CT cervical spine with no acute traumatic findings.  After consultation with neurology (Dr Perez) at the [place] plan is for brain MRI. IF unrevealing MRI anticipate discharge with plan for further outpatient workup (EEG, neurology follow-up)with presumed new onset seizure disorder without antiepileptics. If patient or family desires antiepileptics - consider Keppra 500mg BID pending neurology follow-up. IF abnormal brain MRI follow-up with neurology. Dr Peraza repaired head and facial wounds after blunt trauma.       Significant Events after my assuming care:  MRI brain revealed no acute findings.  See details outlined in the radiology report  Spoke with patient and partner (Jazmin) after completion of brain MRI at 8.45pm.  We discussed reassuring workup thus far however the concern about new onset seizure that could be potentially alcohol withdrawal.  Patient did confirm that he likes to drink beer and he typically drinks about 6 drinks per day with his last drink about 48 hours earlier. We discussed alcohol withdrawal seizure and alcohol use disorder.  After review discussion about the role of antiepileptics given his history of alcohol dependence and new onset seizure patient requested an antiepileptic for home after reviewing risk and  benefit.  He was offered Keppra 500 twice a day pending outpatient [id number] per plan prior to shift change. We reviewed driving restrictions based on state law after seizure event We discussed and reviewed concerning symptoms including reasons to return to the department to be reevaluated.     As patient was readied for discharge to home notified by primary RN at 9.48pm that patient was at high risk for fall and required 2 person assist to put on his pants.  After further discussion with Jazmin and his Sister Dhara- who reports she works as an NST    We ultimately agreed it was prudent to admit the patient for further care due to fall risk.  Patient was reluctant to be admitted for care or transfer.  We ultimately agreed that bed search will be undertaken to help with further care needs. CIWA protocol initiated with medications made available as needed..  Patient had not required any medications to manage concern for withdrawal during his ED course.    Spoke with Dr. Uriostegui-triage provider at 10.38pm  at [facility] who was willing to accept patient for transfer.    At shift end at 1pm patient signed out to Dr. ESTEVAN Ramirez- overnight provider. Patient was awaiting transfer for further care due to fall risk with presumed new onset seizure possibly related to alcohol dependence cannot exclude alcohol withdrawal seizure.  AM BMP pending with hyponatremia (mild) during initial course of care.       Exam:   Patient Vitals for the past 24 hrs:   BP Temp Temp src Pulse Resp SpO2 Height Weight   03/26/24 2339 (!) 146/87 -- -- 71 19 -- -- --   03/26/24 2215 (!) 162/130 -- -- 87 -- -- -- --   03/26/24 2125 (!) 151/98 -- -- -- -- -- -- --   03/26/24 2055 -- -- -- -- -- 96 % -- --   03/26/24 2036 (!) 167/100 -- -- -- -- 97 % -- --   03/26/24 2021 -- -- -- -- -- 96 % -- --   03/26/24 2006 -- -- -- -- -- 99 % -- --   03/26/24 2000 (!) 158/93 -- -- 73 -- 99 % -- --   03/26/24 1951 -- -- -- -- -- 98 % -- --   03/26/24  "1936 -- -- -- -- -- 97 % -- --   03/26/24 1928 (!) 155/112 -- -- -- -- 96 % -- --   03/26/24 1900 (!) 132/99 -- -- 79 -- 96 % -- --   03/26/24 1800 (!) 152/85 -- -- 69 17 95 % -- --   03/26/24 1745 (!) 155/106 -- -- 69 16 98 % -- --   03/26/24 1730 (!) 147/87 -- -- 61 11 99 % -- --   03/26/24 1715 (!) 156/91 -- -- 67 20 98 % -- --   03/26/24 1700 (!) 166/108 -- -- 65 15 98 % -- --   03/26/24 1645 (!) 160/99 -- -- 66 17 98 % -- --   03/26/24 1630 (!) 163/109 -- -- 63 15 98 % -- --   03/26/24 1611 (!) 173/97 -- -- 63 17 99 % -- --   03/26/24 1556 (!) 159/96 -- -- 64 20 98 % -- --   03/26/24 1545 (!) 159/90 -- -- 64 14 -- -- --   03/26/24 1530 (!) 167/88 -- -- 62 16 -- -- --   03/26/24 1515 (!) 161/90 -- -- 62 16 97 % -- --   03/26/24 1500 (!) 159/87 -- -- 63 22 97 % -- --   03/26/24 1430 138/79 -- -- 61 16 94 % -- --   03/26/24 1415 (!) 142/79 -- -- 63 15 95 % -- --   03/26/24 1404 -- -- -- -- -- -- 1.803 m (5' 11\") 99.8 kg (220 lb)   03/26/24 1403 (!) 181/85 98.3  F (36.8  C) Oral 66 18 96 % -- --       ED RESULTS:   Results for orders placed or performed during the hospital encounter of 03/26/24 (from the past 24 hour(s))   [place] Draw     Status: None    Collection Time: 03/26/24  1:58 PM    Narrative    The following orders were created for panel order [place] Draw.  Procedure                               Abnormality         Status                     ---------                               -----------         ------                     Extra Blue Top Tube[[id number]]                              Final result               Extra Red Top Tube[[id number]]                               Final result               Extra Green Top (Lithium...[[id number]]                      Final result               Extra Purple Top Tube[[id number]]                            Final result               Extra Green Top (Lithium...[[id number]]                      Final result                 Please view results for these tests on the " individual orders.   Extra Blue Top Tube     Status: None    Collection Time: 03/26/24  1:58 PM   Result Value Ref Range    Hold Specimen JIC    Extra Red Top Tube     Status: None    Collection Time: 03/26/24  1:58 PM   Result Value Ref Range    Hold Specimen JIC    Extra Green Top (Lithium Heparin) Tube     Status: None    Collection Time: 03/26/24  1:58 PM   Result Value Ref Range    Hold Specimen JIC    Extra Purple Top Tube     Status: None    Collection Time: 03/26/24  1:58 PM   Result Value Ref Range    Hold Specimen JIC    Extra Green Top (Lithium Heparin) ON ICE     Status: None    Collection Time: 03/26/24  1:58 PM   Result Value Ref Range    Hold Specimen JIC    CBC with platelets differential     Status: Abnormal    Collection Time: 03/26/24  1:58 PM    Narrative    The following orders were created for panel order CBC with platelets differential.  Procedure                               Abnormality         Status                     ---------                               -----------         ------                     CBC with platelets and d...[[id number]]  Abnormal            Final result               Manual Differential[[id number]]                              Final result                 Please view results for these tests on the individual orders.   Comprehensive metabolic panel     Status: Abnormal    Collection Time: 03/26/24  1:58 PM   Result Value Ref Range    Sodium 130 (L) 135 - 145 mmol/L    Potassium 3.6 3.4 - 5.3 mmol/L    Carbon Dioxide (CO2) 20 (L) 22 - 29 mmol/L    Anion Gap 18 (H) 7 - 15 mmol/L    Urea Nitrogen 5.6 (L) 6.0 - 20.0 mg/dL    Creatinine 0.76 0.67 - 1.17 mg/dL    GFR Estimate >90 >60 mL/min/1.73m2    Calcium 8.6 8.6 - 10.0 mg/dL    Chloride 92 (L) 98 - 107 mmol/L    Glucose 144 (H) 70 - 99 mg/dL    Alkaline Phosphatase 129 40 - 150 U/L    AST 37 0 - 45 U/L    ALT 27 0 - 70 U/L    Protein Total 8.1 6.4 - 8.3 g/dL    Albumin 3.7 3.5 - 5.2 g/dL    Bilirubin Total 0.5 <=1.2  mg/dL   Lactic acid whole blood     Status: Abnormal    Collection Time: 03/26/24  1:58 PM   Result Value Ref Range    Lactic Acid 4.8 (HH) 0.7 - 2.0 mmol/L   Troponin T, High Sensitivity     Status: Normal    Collection Time: 03/26/24  1:58 PM   Result Value Ref Range    Troponin T, High Sensitivity 12 <=22 ng/L   Magnesium     Status: Abnormal    Collection Time: 03/26/24  1:58 PM   Result Value Ref Range    Magnesium 2.4 (H) 1.7 - 2.3 mg/dL   Ethyl Alcohol Level     Status: Normal    Collection Time: 03/26/24  1:58 PM   Result Value Ref Range    Alcohol ethyl <0.01 <=0.01 g/dL   CBC with platelets and differential     Status: Abnormal    Collection Time: 03/26/24  1:58 PM   Result Value Ref Range    WBC Count 7.4 4.0 - 11.0 10e3/uL    RBC Count 5.43 4.40 - 5.90 10e6/uL    Hemoglobin 15.9 13.3 - 17.7 g/dL    Hematocrit 46.8 40.0 - 53.0 %    MCV 86 78 - 100 fL    MCH 29.3 26.5 - 33.0 pg    MCHC 34.0 31.5 - 36.5 g/dL    RDW 15.9 (H) 10.0 - 15.0 %    Platelet Count 217 150 - 450 10e3/uL    NRBCs per 100 WBC 0 <1 /100    Absolute NRBCs 0.0 10e3/uL   Manual Differential     Status: None    Collection Time: 03/26/24  1:58 PM   Result Value Ref Range    % Neutrophils 77 %    % Lymphocytes 14 %    % Monocytes 8 %    % Eosinophils 0 %    % Basophils 1 %    Absolute Neutrophils 5.7 1.6 - 8.3 10e3/uL    Absolute Lymphocytes 1.0 0.8 - 5.3 10e3/uL    Absolute Monocytes 0.6 0.0 - 1.3 10e3/uL    Absolute Eosinophils 0.0 0.0 - 0.7 10e3/uL    Absolute Basophils 0.1 0.0 - 0.2 10e3/uL    RBC Morphology Confirmed RBC Indices     Platelet Assessment  Automated Count Confirmed. Platelet morphology is normal.     Automated Count Confirmed. Platelet morphology is normal.   Head CT w/o contrast     Status: None    Collection Time: 03/26/24  2:58 PM    Narrative    CT OF THE HEAD WITHOUT CONTRAST March 26, 2024 2:58 PM     HISTORY: Ground level fall w/scalp laceration, rule out seizure like  activity without history of  seizure.    TECHNIQUE: 5 mm thick axial CT images of the head were acquired  without IV contrast material. Radiation dose for this scan was reduced  using automated exposure control, adjustment of the mA and/or kV  according to patient size, or iterative reconstruction technique.    COMPARISON: None.    FINDINGS: There is mild diffuse cerebral volume loss. There are subtle  patchy areas of decreased density in the cerebral white matter  bilaterally that are consistent with sequela of chronic small vessel  ischemic disease.    The ventricles and basal cisterns are within normal limits in  configuration given the degree of cerebral volume loss. There is no  midline shift. There are no extra-axial fluid collections.     No intracranial hemorrhage, mass or recent infarct.    The visualized paranasal sinuses are well-aerated. There is no  mastoiditis. There are no fractures of the visualized bones. There is  a moderate-sized midline forehead scalp hematoma.      Impression    IMPRESSION: Moderate size midline forehead scalp hematoma. Diffuse  cerebral volume loss and cerebral white matter changes consistent with  chronic small vessel ischemic disease. No evidence for acute  intracranial pathology.            GURINDER DAVIS MD         SYSTEM ID:  [id number]   CT Cervical Spine w/o Contrast     Status: None    Collection Time: 03/26/24  2:59 PM    Narrative    CT OF THE CERVICAL SPINE WITHOUT CONTRAST   3/26/2024 2:59 PM     COMPARISON: None    HISTORY: Neck pain in setting of fall and possible seizure.     TECHNIQUE: Axial images of the cervical spine were acquired without  intravenous contrast. Multiplanar reformations were created.        Impression    IMPRESSION: Mild degenerative retrolisthesis of C3 upon C4. Alignment  of the cervical vertebrae is otherwise normal. Vertebral body heights  of the cervical spine are normal. Craniocervical alignment is normal.  There are no fractures of the cervical spine. Mild facet  arthropathy  throughout the cervical spine. No high-grade spinal canal stenosis. No  prevertebral soft tissue swelling.      Radiation dose for this scan was reduced using automated exposure  control, adjustment of the mA and/or kV according to patient size, or  iterative reconstruction technique    GURINDER DAVIS MD         SYSTEM ID:  [id number]   Lactic acid whole blood     Status: Normal    Collection Time: 03/26/24  3:53 PM   Result Value Ref Range    Lactic Acid 1.9 0.7 - 2.0 mmol/L   -Laceration Repair     Status: None    Collection Time: 03/26/24  5:03 PM    Narrative    Mitul Peraza MD     3/26/2024  6:02 PM  [facility]    -Laceration Repair    Date/Time: 3/26/2024 5:03 PM    Performed by: Mitul Peraza MD  Authorized by: Mitul Peraza MD    Risks, benefits and alternatives discussed.      ANESTHESIA (see MAR for exact dosages):     Anesthesia method:  Local infiltration    Local anesthetic:  Bupivacaine 0.25% WITH epi  LACERATION DETAILS     Location:  Scalp    Scalp location:  R parietal    Length (cm):  2.5    REPAIR TYPE:     Repair type:  Simple    EXPLORATION:     Hemostasis achieved with:  Direct pressure    Wound exploration: wound explored through full range of motion and   entire depth of wound probed and visualized      Wound extent: no foreign body and no underlying fracture      Contaminated: no      TREATMENT:     Area cleansed with:  Saline    Irrigation solution:  Sterile saline    Irrigation volume:  500    Visualized foreign bodies/material removed: no      SKIN REPAIR     Repair method:  Staples    Number of staples:  9    APPROXIMATION     Approximation:  Close    POST-PROCEDURE DETAILS     Dressing:  Antibiotic ointment      PROCEDURE    Patient Tolerance:  Patient tolerated the procedure well with no immediate   complications   MR Brain w/o & w Contrast     Status: None    Collection Time: 03/26/24  6:38 PM    Narrative    EXAM: MR BRAIN  W/O and W CONTRAST  LOCATION: [facility]  DATE: 3/26/2024    INDICATION: Seizure.  COMPARISON: Head CT 03/26/2024  CONTRAST: 9ml gadavist  TECHNIQUE: Routine multiplanar multisequence head MRI without and with intravenous contrast.    FINDINGS:  There is no restricted diffusion. Mild mucosal thickening in the sinuses. Mastoid air cells appear free from significant disease. Intraorbital contents are unremarkable. There is mild generalized prominence of the sulci and ventricles, consistent with   underlying volume loss. Intracranial flow voids are intact. There is no mass effect, midline shift, or extraaxial collection. Signal intensity of the brain parenchyma is within normal limits for the patient's age. No evidence for acute or chronic   intracranial blood products. Scalp swelling.      Impression    IMPRESSION:  1.  No acute intracranial finding. No evidence for recent ischemia, intracranial hemorrhage, or mass.  2.  Mild generalized volume loss.         ED MEDICATIONS:   Medications   cloNIDine (CATAPRES) tablet 0.1 mg (has no administration in time range)   OLANZapine zydis (zyPREXA) ODT tab 5-10 mg (has no administration in time range)     Or   haloperidol lactate (HALDOL) injection 2.5-5 mg (has no administration in time range)   flumazenil (ROMAZICON) injection 0.2 mg (has no administration in time range)   melatonin tablet 5 mg (has no administration in time range)   gabapentin (NEURONTIN) tablet 1,200 mg (has no administration in time range)   gabapentin (NEURONTIN) capsule 900 mg (has no administration in time range)   gabapentin (NEURONTIN) capsule 600 mg (has no administration in time range)   gabapentin (NEURONTIN) capsule 300 mg (has no administration in time range)   gabapentin (NEURONTIN) capsule 100 mg (has no administration in time range)   LORazepam (ATIVAN) tablet 1-2 mg (has no administration in time range)     Or   LORazepam (ATIVAN) injection 1-2 mg (has no  administration in time range)   thiamine (B-1) tablet 100 mg (has no administration in time range)   folic acid (FOLVITE) tablet 1 mg (has no administration in time range)   multivitamin w/minerals (THERA-VIT-M) tablet 1 tablet (has no administration in time range)   lactated ringers BOLUS 1,000 mL (0 mLs Intravenous Stopped 3/26/24 2040)   gadobutrol (GADAVIST) injection 9 mL (9 mLs Intravenous $Given 3/26/24 1812)   ibuprofen (ADVIL/MOTRIN) tablet 600 mg (600 mg Oral $Given 3/26/24 2046)         Impression:    ICD-10-CM    1. Seizure-like activity (H)  R56.9 EEG     Adult Neurology  Referral      2. Closed head injury, initial encounter  S09.90XA       3. Scalp laceration, initial encounter  S01.01XA       4. Hyponatremia  E87.1           Plan:    At shift end patient was awaiting transfer for further inpatient care due to concern for fall risk as patient required 2 person assist to put his pants on. Inpatient care allows for serial neurologic examination and to expedite further workup including EEG monitoring as medically required and neurology consultation if medically required.      MD Maria Guadalupe Ortega Ebenezer Tope, MD  03/27/24 0103

## 2024-03-26 NOTE — ED TRIAGE NOTES
Patient arrived via EMS. Wife reports seizure like activity after falling and striking his right forehead on the side table. Wife reports full body shaking. Lasting ~3-5 minutes. Patient alert and orientated upon arrival to ED. Per wife was not at baseline upon awakening. Not on blood thinners.      Triage Assessment (Adult)       Row Name 03/26/24 1351          Triage Assessment    Airway WDL WDL        Respiratory WDL    Respiratory WDL WDL        Skin Circulation/Temperature WDL    Skin Circulation/Temperature WDL X  LACERATION TO RIGHT FOREARM        Cardiac WDL    Cardiac WDL WDL        Peripheral/Neurovascular WDL    Peripheral Neurovascular WDL WDL        Cognitive/Neuro/Behavioral WDL    Cognitive/Neuro/Behavioral WDL X     Level of Consciousness alert     Arousal Level opens eyes spontaneously     Orientation oriented x 4     Speech logical;clear     Mood/Behavior calm;cooperative        Pupils (CN II)    Pupil PERRLA yes     Pupil Size Left 5 mm     Pupil Size Right 5 mm        Vanessa Coma Scale    Best Eye Response 4-->(E4) spontaneous     Best Motor Response 6-->(M6) obeys commands     Best Verbal Response 5-->(V5) oriented     Vanessa Coma Scale Score 15

## 2024-03-27 ENCOUNTER — APPOINTMENT (OUTPATIENT)
Dept: PHYSICAL THERAPY | Facility: CLINIC | Age: 59
End: 2024-03-27
Attending: HOSPITALIST
Payer: COMMERCIAL

## 2024-03-27 ENCOUNTER — HOSPITAL ENCOUNTER (INPATIENT)
Facility: CLINIC | Age: 59
LOS: 1 days | Discharge: HOME | End: 2024-03-27
Attending: HOSPITALIST | Admitting: FAMILY MEDICINE
Payer: COMMERCIAL

## 2024-03-27 VITALS
RESPIRATION RATE: 18 BRPM | SYSTOLIC BLOOD PRESSURE: 135 MMHG | DIASTOLIC BLOOD PRESSURE: 85 MMHG | OXYGEN SATURATION: 96 % | HEIGHT: 71 IN | HEART RATE: 72 BPM | BODY MASS INDEX: 30.8 KG/M2 | WEIGHT: 220 LBS | TEMPERATURE: 98.3 F

## 2024-03-27 VITALS
OXYGEN SATURATION: 100 % | TEMPERATURE: 98.7 F | DIASTOLIC BLOOD PRESSURE: 98 MMHG | RESPIRATION RATE: 18 BRPM | HEART RATE: 89 BPM | SYSTOLIC BLOOD PRESSURE: 125 MMHG

## 2024-03-27 DIAGNOSIS — M45.8 ANKYLOSING SPONDYLITIS OF SACRAL REGION (H): ICD-10-CM

## 2024-03-27 DIAGNOSIS — R56.9 SEIZURE (H): Primary | ICD-10-CM

## 2024-03-27 LAB
ALBUMIN UR-MCNC: 100 MG/DL
ANION GAP SERPL CALCULATED.3IONS-SCNC: 11 MMOL/L (ref 7–15)
APPEARANCE UR: CLEAR
BILIRUB UR QL STRIP: NEGATIVE
BUN SERPL-MCNC: 6.4 MG/DL (ref 6–20)
CALCIUM SERPL-MCNC: 8.5 MG/DL (ref 8.6–10)
CHLORIDE SERPL-SCNC: 100 MMOL/L (ref 98–107)
CK SERPL-CCNC: 689 U/L (ref 39–308)
COLOR UR AUTO: YELLOW
CREAT SERPL-MCNC: 0.74 MG/DL (ref 0.67–1.17)
DEPRECATED HCO3 PLAS-SCNC: 23 MMOL/L (ref 22–29)
EGFRCR SERPLBLD CKD-EPI 2021: >90 ML/MIN/1.73M2
ERYTHROCYTE [DISTWIDTH] IN BLOOD BY AUTOMATED COUNT: 16.3 % (ref 10–15)
GLUCOSE SERPL-MCNC: 89 MG/DL (ref 70–99)
GLUCOSE UR STRIP-MCNC: NEGATIVE MG/DL
HCT VFR BLD AUTO: 45.9 % (ref 40–53)
HGB BLD-MCNC: 15.2 G/DL (ref 13.3–17.7)
HGB UR QL STRIP: NEGATIVE
KETONES UR STRIP-MCNC: NEGATIVE MG/DL
LEUKOCYTE ESTERASE UR QL STRIP: NEGATIVE
MAGNESIUM SERPL-MCNC: 2.3 MG/DL (ref 1.7–2.3)
MCH RBC QN AUTO: 28.7 PG (ref 26.5–33)
MCHC RBC AUTO-ENTMCNC: 33.1 G/DL (ref 31.5–36.5)
MCV RBC AUTO: 87 FL (ref 78–100)
MUCOUS THREADS #/AREA URNS LPF: PRESENT /LPF
NITRATE UR QL: NEGATIVE
PH UR STRIP: 7 [PH] (ref 5–7)
PHOSPHATE SERPL-MCNC: 3.2 MG/DL (ref 2.5–4.5)
PLATELET # BLD AUTO: 201 10E3/UL (ref 150–450)
POTASSIUM SERPL-SCNC: 3.5 MMOL/L (ref 3.4–5.3)
RBC # BLD AUTO: 5.3 10E6/UL (ref 4.4–5.9)
RBC URINE: 1 /HPF
SODIUM SERPL-SCNC: 134 MMOL/L (ref 135–145)
SP GR UR STRIP: 1.01 (ref 1–1.03)
UROBILINOGEN UR STRIP-MCNC: 2 MG/DL
WBC # BLD AUTO: 9.6 10E3/UL (ref 4–11)
WBC URINE: 1 /HPF

## 2024-03-27 PROCEDURE — 84100 ASSAY OF PHOSPHORUS: CPT | Performed by: HOSPITALIST

## 2024-03-27 PROCEDURE — 97161 PT EVAL LOW COMPLEX 20 MIN: CPT | Mod: GP

## 2024-03-27 PROCEDURE — 99222 1ST HOSP IP/OBS MODERATE 55: CPT | Performed by: STUDENT IN AN ORGANIZED HEALTH CARE EDUCATION/TRAINING PROGRAM

## 2024-03-27 PROCEDURE — 83735 ASSAY OF MAGNESIUM: CPT | Performed by: HOSPITALIST

## 2024-03-27 PROCEDURE — 999N000111 HC STATISTIC OT IP EVAL DEFER

## 2024-03-27 PROCEDURE — 120N000002 HC R&B MED SURG/OB UMMC

## 2024-03-27 PROCEDURE — 258N000003 HC RX IP 258 OP 636

## 2024-03-27 PROCEDURE — 97530 THERAPEUTIC ACTIVITIES: CPT | Mod: GP

## 2024-03-27 PROCEDURE — 250N000013 HC RX MED GY IP 250 OP 250 PS 637: Performed by: EMERGENCY MEDICINE

## 2024-03-27 PROCEDURE — 99235 HOSP IP/OBS SAME DATE MOD 70: CPT | Mod: GC

## 2024-03-27 PROCEDURE — 82550 ASSAY OF CK (CPK): CPT | Performed by: STUDENT IN AN ORGANIZED HEALTH CARE EDUCATION/TRAINING PROGRAM

## 2024-03-27 PROCEDURE — 80048 BASIC METABOLIC PNL TOTAL CA: CPT

## 2024-03-27 PROCEDURE — 250N000013 HC RX MED GY IP 250 OP 250 PS 637

## 2024-03-27 PROCEDURE — 36415 COLL VENOUS BLD VENIPUNCTURE: CPT

## 2024-03-27 PROCEDURE — 81001 URINALYSIS AUTO W/SCOPE: CPT

## 2024-03-27 PROCEDURE — 85027 COMPLETE CBC AUTOMATED: CPT

## 2024-03-27 PROCEDURE — 250N000011 HC RX IP 250 OP 636

## 2024-03-27 RX ORDER — CLONIDINE HYDROCHLORIDE 0.1 MG/1
0.1 TABLET ORAL EVERY 8 HOURS
Status: DISCONTINUED | OUTPATIENT
Start: 2024-03-27 | End: 2024-03-27 | Stop reason: HOSPADM

## 2024-03-27 RX ORDER — GABAPENTIN 100 MG/1
100 CAPSULE ORAL EVERY 8 HOURS
Qty: 9 CAPSULE | Refills: 0 | Status: DISCONTINUED | OUTPATIENT
Start: 2024-04-03 | End: 2024-03-27 | Stop reason: HOSPADM

## 2024-03-27 RX ORDER — NICOTINE 21 MG/24HR
1 PATCH, TRANSDERMAL 24 HOURS TRANSDERMAL DAILY
Status: DISCONTINUED | OUTPATIENT
Start: 2024-03-27 | End: 2024-03-27 | Stop reason: HOSPADM

## 2024-03-27 RX ORDER — ACETAMINOPHEN 325 MG/1
650 TABLET ORAL EVERY 4 HOURS PRN
Status: DISCONTINUED | OUTPATIENT
Start: 2024-03-27 | End: 2024-03-27 | Stop reason: ALTCHOICE

## 2024-03-27 RX ORDER — LEVETIRACETAM 500 MG/1
500 TABLET ORAL 2 TIMES DAILY
Qty: 60 TABLET | Refills: 0 | Status: SHIPPED | OUTPATIENT
Start: 2024-03-27 | End: 2024-03-27

## 2024-03-27 RX ORDER — DIAZEPAM 10 MG/2ML
5-10 INJECTION, SOLUTION INTRAMUSCULAR; INTRAVENOUS EVERY 30 MIN PRN
Status: DISCONTINUED | OUTPATIENT
Start: 2024-03-27 | End: 2024-03-27 | Stop reason: HOSPADM

## 2024-03-27 RX ORDER — LEVETIRACETAM 500 MG/1
500 TABLET ORAL 2 TIMES DAILY
Status: CANCELLED | OUTPATIENT
Start: 2024-03-27

## 2024-03-27 RX ORDER — KETOROLAC TROMETHAMINE 15 MG/ML
15 INJECTION, SOLUTION INTRAMUSCULAR; INTRAVENOUS EVERY 6 HOURS PRN
Status: DISCONTINUED | OUTPATIENT
Start: 2024-03-27 | End: 2024-03-27 | Stop reason: HOSPADM

## 2024-03-27 RX ORDER — ACETAMINOPHEN 325 MG/1
975 TABLET ORAL EVERY 6 HOURS
Status: DISCONTINUED | OUTPATIENT
Start: 2024-03-27 | End: 2024-03-27 | Stop reason: HOSPADM

## 2024-03-27 RX ORDER — POLYETHYLENE GLYCOL 3350 17 G
2 POWDER IN PACKET (EA) ORAL
Status: DISCONTINUED | OUTPATIENT
Start: 2024-03-27 | End: 2024-03-27 | Stop reason: HOSPADM

## 2024-03-27 RX ORDER — CALCIUM CARBONATE 500 MG/1
1000 TABLET, CHEWABLE ORAL 4 TIMES DAILY PRN
Status: DISCONTINUED | OUTPATIENT
Start: 2024-03-27 | End: 2024-03-27 | Stop reason: HOSPADM

## 2024-03-27 RX ORDER — DIAZEPAM 10 MG
10 TABLET ORAL EVERY 30 MIN PRN
Status: DISCONTINUED | OUTPATIENT
Start: 2024-03-27 | End: 2024-03-27 | Stop reason: HOSPADM

## 2024-03-27 RX ORDER — THIAMINE HYDROCHLORIDE 100 MG/ML
250 INJECTION, SOLUTION INTRAMUSCULAR; INTRAVENOUS DAILY
Status: DISCONTINUED | OUTPATIENT
Start: 2024-03-29 | End: 2024-03-27 | Stop reason: HOSPADM

## 2024-03-27 RX ORDER — FLUMAZENIL 0.1 MG/ML
0.2 INJECTION, SOLUTION INTRAVENOUS
Status: DISCONTINUED | OUTPATIENT
Start: 2024-03-27 | End: 2024-03-27 | Stop reason: HOSPADM

## 2024-03-27 RX ORDER — AMOXICILLIN 250 MG
2 CAPSULE ORAL 2 TIMES DAILY PRN
Status: DISCONTINUED | OUTPATIENT
Start: 2024-03-27 | End: 2024-03-27 | Stop reason: HOSPADM

## 2024-03-27 RX ORDER — ONDANSETRON 4 MG/1
4 TABLET, ORALLY DISINTEGRATING ORAL EVERY 6 HOURS PRN
Status: DISCONTINUED | OUTPATIENT
Start: 2024-03-27 | End: 2024-03-27 | Stop reason: HOSPADM

## 2024-03-27 RX ORDER — ONDANSETRON 2 MG/ML
4 INJECTION INTRAMUSCULAR; INTRAVENOUS EVERY 6 HOURS PRN
Status: DISCONTINUED | OUTPATIENT
Start: 2024-03-27 | End: 2024-03-27 | Stop reason: HOSPADM

## 2024-03-27 RX ORDER — LIDOCAINE 40 MG/G
CREAM TOPICAL
Status: DISCONTINUED | OUTPATIENT
Start: 2024-03-27 | End: 2024-03-27 | Stop reason: HOSPADM

## 2024-03-27 RX ORDER — THIAMINE HYDROCHLORIDE 100 MG/ML
500 INJECTION, SOLUTION INTRAMUSCULAR; INTRAVENOUS 3 TIMES DAILY
Status: DISCONTINUED | OUTPATIENT
Start: 2024-03-27 | End: 2024-03-27 | Stop reason: HOSPADM

## 2024-03-27 RX ORDER — MULTIPLE VITAMINS W/ MINERALS TAB 9MG-400MCG
1 TAB ORAL DAILY
Status: DISCONTINUED | OUTPATIENT
Start: 2024-03-27 | End: 2024-03-27 | Stop reason: HOSPADM

## 2024-03-27 RX ORDER — GABAPENTIN 300 MG/1
300 CAPSULE ORAL EVERY 8 HOURS
Qty: 6 CAPSULE | Refills: 0 | Status: DISCONTINUED | OUTPATIENT
Start: 2024-04-01 | End: 2024-03-27 | Stop reason: HOSPADM

## 2024-03-27 RX ORDER — AMOXICILLIN 250 MG
1 CAPSULE ORAL 2 TIMES DAILY PRN
Status: DISCONTINUED | OUTPATIENT
Start: 2024-03-27 | End: 2024-03-27 | Stop reason: HOSPADM

## 2024-03-27 RX ORDER — SODIUM CHLORIDE 9 MG/ML
INJECTION, SOLUTION INTRAVENOUS CONTINUOUS
Status: DISCONTINUED | OUTPATIENT
Start: 2024-03-27 | End: 2024-03-27 | Stop reason: HOSPADM

## 2024-03-27 RX ORDER — LEVETIRACETAM 500 MG/1
500 TABLET ORAL 2 TIMES DAILY
Status: DISCONTINUED | OUTPATIENT
Start: 2024-03-27 | End: 2024-03-27

## 2024-03-27 RX ORDER — GABAPENTIN 300 MG/1
600 CAPSULE ORAL EVERY 8 HOURS
Qty: 12 CAPSULE | Refills: 0 | Status: DISCONTINUED | OUTPATIENT
Start: 2024-03-30 | End: 2024-03-27 | Stop reason: HOSPADM

## 2024-03-27 RX ORDER — ACETAMINOPHEN 650 MG/1
650 SUPPOSITORY RECTAL EVERY 4 HOURS PRN
Status: DISCONTINUED | OUTPATIENT
Start: 2024-03-27 | End: 2024-03-27 | Stop reason: ALTCHOICE

## 2024-03-27 RX ORDER — GABAPENTIN 300 MG/1
900 CAPSULE ORAL EVERY 8 HOURS
Qty: 27 CAPSULE | Refills: 0 | Status: DISCONTINUED | OUTPATIENT
Start: 2024-03-27 | End: 2024-03-27 | Stop reason: HOSPADM

## 2024-03-27 RX ORDER — FOLIC ACID 1 MG/1
1 TABLET ORAL DAILY
Status: DISCONTINUED | OUTPATIENT
Start: 2024-03-27 | End: 2024-03-27 | Stop reason: HOSPADM

## 2024-03-27 RX ADMIN — CLONIDINE HYDROCHLORIDE 0.1 MG: 0.1 TABLET ORAL at 09:59

## 2024-03-27 RX ADMIN — Medication 1 TABLET: at 09:59

## 2024-03-27 RX ADMIN — NICOTINE 1 PATCH: 14 PATCH, EXTENDED RELEASE TRANSDERMAL at 09:58

## 2024-03-27 RX ADMIN — ACETAMINOPHEN 975 MG: 325 TABLET, FILM COATED ORAL at 06:27

## 2024-03-27 RX ADMIN — ACETAMINOPHEN 975 MG: 325 TABLET, FILM COATED ORAL at 11:55

## 2024-03-27 RX ADMIN — SODIUM CHLORIDE: 9 INJECTION, SOLUTION INTRAVENOUS at 06:29

## 2024-03-27 RX ADMIN — NICOTINE 1 PATCH: 7 PATCH, EXTENDED RELEASE TRANSDERMAL at 01:01

## 2024-03-27 RX ADMIN — GABAPENTIN 900 MG: 300 CAPSULE ORAL at 09:59

## 2024-03-27 RX ADMIN — FOLIC ACID 1 MG: 1 TABLET ORAL at 09:59

## 2024-03-27 RX ADMIN — THIAMINE HYDROCHLORIDE 500 MG: 100 INJECTION, SOLUTION INTRAMUSCULAR; INTRAVENOUS at 09:59

## 2024-03-27 RX ADMIN — THIAMINE HYDROCHLORIDE 500 MG: 100 INJECTION, SOLUTION INTRAMUSCULAR; INTRAVENOUS at 14:12

## 2024-03-27 ASSESSMENT — LIFESTYLE VARIABLES
ANXIETY: MILDLY ANXIOUS
PAROXYSMAL SWEATS: NO SWEAT VISIBLE
AUDITORY DISTURBANCES: NOT PRESENT
ORIENTATION AND CLOUDING OF SENSORIUM: ORIENTED AND CAN DO SERIAL ADDITIONS
NAUSEA AND VOMITING: NO NAUSEA AND NO VOMITING
TOTAL SCORE: 4
VISUAL DISTURBANCES: NOT PRESENT
HEADACHE, FULLNESS IN HEAD: NOT PRESENT
TREMOR: 3
AGITATION: NORMAL ACTIVITY

## 2024-03-27 ASSESSMENT — ACTIVITIES OF DAILY LIVING (ADL)
ADLS_ACUITY_SCORE: 37
ADLS_ACUITY_SCORE: 37
ADLS_ACUITY_SCORE: 38
ADLS_ACUITY_SCORE: 35
ADLS_ACUITY_SCORE: 37
ADLS_ACUITY_SCORE: 37
ADLS_ACUITY_SCORE: 38
ADLS_ACUITY_SCORE: 31
ADLS_ACUITY_SCORE: 38
ADLS_ACUITY_SCORE: 37
ADLS_ACUITY_SCORE: 37

## 2024-03-27 NOTE — PLAN OF CARE
Pt was safely admitted to the unit with personal belongings around 0220.    Admission vitals completed, 2 RN skin assessment completed with Nakita BARRETT, bill of rights explained, admission questions asked.

## 2024-03-27 NOTE — H&P
[facility]    History and Physical - [facility]       Date of Admission:  3/27/2024    Assessment & Plan      Adama Cid is a 58 year old male with a past medical history of ankylosing spondylosis, HTN, Tobacco use disorder admitted on 3/27/2024 for fall, scalp hematoma and seizure like activity concerning for alcohol withdrawal seizure.     Provoked Seizure  Alcohol Use Disorder  Persistent alcohol use of at min 6 beers per day with recent sobriety in last 3 days preceding fall. No clear mood condition driving use but suspect with chronic debilitating back pain. Initially not truthful about use is concerning for alcohol use disorder. Has signs of tongue laceration and history consistent with seizure. Vitally improved and stable now 72 hours after his last drink, not appreciating tremors or tongue fasciculations, but will need another 24-48 hours of monitoring in case of recurrent seizure. CIWA down trending (11 to 4) after initial Ativan, but will need to slowly wean off benzo's if requiring more doses. Initial MRI demonstrates white matter loss which may be due to chronic alcohol use vs small vessel ischemic disease, no acute processes like stroke, and volume loss would be unexpected for age. Demonstrates mild dysmetria which is consistent with cerebellar ataxia, further consistent as alcohol related or Wernicke's. No other clear infectious, metabolic (HypoNA quite mild) or focal neurologic cause to explain seizure. Given this is a first time provoked seizure, concerned about placing patient on long term antiepileptic medication without Neurology input first.     -Neurology Consulted, Please page in morning  -Holding Keppra 500 mg BID (1st dose timed 3/27 Qam)  -CIWA,   with Valium 5-10 mg  -0.1 mg Clonidine Q8hr PRN  -Gabapentin taper, starting  900 mg QAM  consider continuing at 600 mg TID for alcohol cessation vs naltrexone  -Offer Chem  Dep referral tomorrow if patient is willing  -Wernicke's Protocol  -Folic Acid  -Multivitamins  -Q8hr Vitals  -Continuous Pulse Ox  -Seizure Precautions  -Up with assist  -Fall Precautions  -Daily BMP, CBC  -Zofran/Compazine PRN  -Senna PRN    Scalp Hematoma   Tongue Laceration  Closed Head Injury  Unwitnessed fall  CT demonstrates moderate midline forehead hematoma. Received Right Parietal Scalp  laceration repair. This is significant trauma after a mechanical fall. Tongue is lacerated bilaterally at anterior aspect, but not appreciating other injuries. Will need continued wound cares after primary closure  -Wound Nurse  -Pain    -Tylenol 975 mg Q6hr scheduled   -Toradol 15 mg Q6hr prn    Hypochloremic Hyponatremia  Mild, most likely in setting of dehydration with continued alcohol use. Given signs of dehydration on exam, no heart failure history but poor follow up with current conditions, and anticipation patient will not get up to drink with post seizure tiredness, will offer IVF at slightly less than maintenance rate @140 ml/hr to avoid fluid overload. NS fluids will correct both abnormalities.  -mIVF  ml/hr (timed to discontinue in 24 hours)    Hypophosphatemia  Hypermagnesemia, resolved  Hypophosphatemia 2.2 with initial Hypermagnesemia 2.4. Presume Hypermagnesemia in setting of muscle injury, which has improved to normal limit. Potential for refeeding syndrome with chronic alcohol use and signs of dehydration.   -Standard Phosphate/Potassium/Mag Repletion    Lactic Acidosis, resolved  Anion Gap Metabolic Acidosis  Lactic Acid 4.8 with resolved to normal limit after 1 L LR. AG 18 is consistent. Likely in setting of seizure. No clear infectious source or consistent vitals and lab work.   -CTM via BMP    Chronic anteroseptal Infarct  Tobacco Use Disorder  Hypertension  Troponin Elevation (12)  No current treatment for HTN in EMR. Chronic smoker with concerning consumption history. Had transient  "hypertension on presentation, most likely exacerbated in setting of alcohol withdrawal and post seizure.   Found to have incidental old anteroseptal infarct on EKG without acute changes and mildly elevated troponin  -- unlikely current MI without reporting cardiac symptoms. Suspect demand ischemia led to mild troponin elevation. Could be focus of emboli or potential vasculopath with carotid disease leading to chronic small vessel disease as explanation of cerebral volume loss.   -Follow up outpatient echo vs stress test and carotid ultrasound  -Nicotine Patch 14 mg Q24 hr  -Nicotine Lozenge     Ankylosing Spondylosis  Chronic waxing and waning low back pain since adolescence which has led to early residential. 2021 MRI shows evolution of marrow signal changes at T11-T12. The patient has not followed up with prior Rheum referral. Concerned about safety going home at discharge with such debilitating disease.  -Pain control as above  -Consider Rheumatology referral at discharge  -PT/OT          Diet:  Regular  DVT Prophylaxis: Pneumatic Compression Devices (Padua 4 -- immobilization and rheum condition)  Fleming Catheter: Not present  Fluids: PO, 125 ml/hr NS to be timed out 3/28 0500 hr  Lines: PIV   Cardiac Monitoring: None  Code Status:  Full Code    Clinically Significant Risk Factors Present on Admission                  # Hypertension: Noted on problem list      # Obesity: Estimated body mass index is 30.68 kg/m  as calculated from the following:    Height as of 3/26/24: 1.803 m (5' 11\").    Weight as of 3/26/24: 99.8 kg (220 lb).              Disposition Plan      Expected Discharge Date: 03/29/2024, 12:00 PM            The patient's care was discussed with the Attending Physician, Dr. Crespo .      Juan M Cosme MD  [facility]  [facility]  Securely message with [employer] (more info)  Text page via [facility] Paging/Directory   See signed in provider " for up to date coverage information  ______________________________________________________________________    Chief Complaint   Unwitnessed Fall, Seizure    History is obtained from the patient    History of Present Illness   Adama KIRAN Cid is a 58 year old male who has a history of ankylosing spondylosis, HTN and tobacco use disorder  and is admitted for unwitnessed fall and seizure like activity. The patient reports a baseline intake of 6 or more beers in the past month. His last drink was 3 days ago. This evening, he thinks he fell and hit his head. Per Chart Review, his wife heard him fall, observed seizure like activity and called EMS. He reports feeling tired now, acknowledges his head and tongue injuries, but denies these or any other area in body being painful other than his back. He describes having chronic back pain which is aggravated by any movement, leading to a severe shock like sensation. He treats his pain frequently with aleve. He is retired since age 51 years and does not do much of anything at home secondary to pain.    In the ED, the patient was found to have a lacerated tongue and parietal scalp laceration which was repaired with staples. Initial vitals: 159/96, HR 64, Afebrile, RR 20 and Sat'in well on Room Air  Interventions: 0.1 mg Clonidine, 1 mg Ativan, 1200 mg Gabapentin, 600 mg Ibuprofen, 1 mg Folic Acid, 1 Multivitamin tab, 100 mg Thiamine tab, and 1L LR  Labwork: CMP: , Cl 92, Gluc 144, Ag 18, Cr. 0.76; Mg 2.4 (H), Phos 2.2 (L), Lactic Acid 4.8, Trop 12, Alcohol Ethyl 0.01.   Imaging:   MRI Brain demonstrates no acute intracranial process; mild generalized volume loss  CT Head: No acute intracranial process  CT Cervical Spine: Mild degenerative retrolisthesis of C3 upon C4. No acute fractures, vertebral heigh loss or misalignment;   CIWA: 11 -> 4      Past Medical History    Past Medical History:   Diagnosis Date    Hypertension 6/6/2017       Past Surgical History   Past Surgical  History:   Procedure Laterality Date    ZZC TOTAL KNEE ARTHROPLASTY         Prior to Admission Medications   Prior to Admission Medications   Prescriptions Last Dose Informant Patient Reported? Taking?   Misc Natural Products (OSTEO BI-FLEX ADV DOUBLE ST) TABS   Yes No   Sig: Take  by mouth 2 times daily.   Patient not taking: Reported on 11/1/2021   levETIRAcetam (KEPPRA) 500 MG tablet   No No   Sig: Take 1 tablet (500 mg) by mouth 2 times daily for 30 days   naproxen sodium (ALEVE) 220 MG capsule   Yes No   Sig: Take 220 mg by mouth daily as needed.   Patient not taking: Reported on 11/1/2021   piroxicam (FELDENE) 20 MG capsule   No No   Sig: Take 1 capsule (20 mg) by mouth daily (with breakfast)      Facility-Administered Medications: None        Review of Systems    The 10 point Review of Systems is negative other than noted in the HPI or here.     Denies fevers, chills, headache, vision changes, hearing changes, neck pain, chest pain, lightheadedness, dizziness, shortness of breath, cough, abdominal pain, nausea, emesis, weakness, myalgias, arthralgias, rashes, urinary pain or issues voiding, sadness, anhedonia, anxiety and Hallucinations.     Social History   I have reviewed this patient's social history and updated it with pertinent information if needed.  Social History     Tobacco Use    Smoking status: Every Day     Packs/day: 1.5     Types: Cigarettes    Smokeless tobacco: Never    Tobacco comments:     1-1.5 ppd.   Substance Use Topics    Alcohol use: Yes     Comment: occ.    Drug use: No         Family History     No significant family history of seizure disorders      Allergies   No Known Allergies     Physical Exam   Vital Signs: Temp: 98.8  F (37.1  C) Temp src: Oral BP: 120/85 Pulse: 70     SpO2: 97 % O2 Device: None (Room air)    Weight: 0 lbs 0 oz    Constitutional: sleeping supine in hospital bed, cooperative, no apparent distress, and appears stated age  Eyes: pupils equal, round and reactive to  light, extra ocular muscles intact, sclera clear, conjunctiva normal  ENT: Normocephalic. Extensive forehead abrasions leading to scalp and brow,  non tender midline forehead swelling with light palpation, non tender sinuses, Right parietal scalp laceration with sutures. dry mucous membranes, poor dentition without obvious broken teeth, tongue with bilateral lateral ecchymosis, mild hematomas and mild lacerations.  Hematologic / Lymphatic: no cervical lymphadenopathy and no supraclavicular lymphadenopathy  Respiratory: No increased work of breathing, good air exchange, Slightly coarse breath sounds bilaterally otherwise clear without crackles or wheezing  Cardiovascular: Regular rate and rhythm, normal S1 and S2, no murmur noted, peripheral pulses strong and no peripheral edema.   GI: No scars, normal bowel sounds, soft, non-distended, non-tender, no masses palpated, no hepatosplenomegaly  Skin:  No concerning skin rashes or lesions  Musculoskeletal: There is no redness, warmth, or swelling of the joints or extremity muscles.    Neurologic: Rouses with voice easily, Alert, oriented to name, place, time and circumstance.  Cranial nerves II-XII are grossly intact.  5/5  strength and Dorsiflexion/plantar flexion.  Cerebellar finger to nose with mild dysmetria.  Intact rapid alternating movements. Sensory is intact. No tremors or tongue fasciculations. GCS 14.   Neuropsychiatric: General: normal, calm, and normal eye contact    Medical Decision Making       Please see A&P for additional details of medical decision making.      Data     I have personally reviewed the following data over the past 24 hrs:    7.4  \   15.9   / 217     130 (L) 92 (L) 5.6 (L) /  144 (H)   3.6 20 (L) 0.76 \     ALT: 27 AST: 37 AP: 129 TBILI: 0.5   ALB: 3.7 TOT PROTEIN: 8.1 LIPASE: N/A     Trop: 12 BNP: N/A     Procal: N/A CRP: N/A Lactic Acid: 1.9         Imaging results reviewed over the past 24 hrs:   Recent Results (from the past 24  hour(s))   Head CT w/o contrast    Narrative    CT OF THE HEAD WITHOUT CONTRAST March 26, 2024 2:58 PM     HISTORY: Ground level fall w/scalp laceration, rule out seizure like  activity without history of seizure.    TECHNIQUE: 5 mm thick axial CT images of the head were acquired  without IV contrast material. Radiation dose for this scan was reduced  using automated exposure control, adjustment of the mA and/or kV  according to patient size, or iterative reconstruction technique.    COMPARISON: None.    FINDINGS: There is mild diffuse cerebral volume loss. There are subtle  patchy areas of decreased density in the cerebral white matter  bilaterally that are consistent with sequela of chronic small vessel  ischemic disease.    The ventricles and basal cisterns are within normal limits in  configuration given the degree of cerebral volume loss. There is no  midline shift. There are no extra-axial fluid collections.     No intracranial hemorrhage, mass or recent infarct.    The visualized paranasal sinuses are well-aerated. There is no  mastoiditis. There are no fractures of the visualized bones. There is  a moderate-sized midline forehead scalp hematoma.      Impression    IMPRESSION: Moderate size midline forehead scalp hematoma. Diffuse  cerebral volume loss and cerebral white matter changes consistent with  chronic small vessel ischemic disease. No evidence for acute  intracranial pathology.            GURINDER DAVIS MD         SYSTEM ID:  [id number]   CT Cervical Spine w/o Contrast    Narrative    CT OF THE CERVICAL SPINE WITHOUT CONTRAST   3/26/2024 2:59 PM     COMPARISON: None    HISTORY: Neck pain in setting of fall and possible seizure.     TECHNIQUE: Axial images of the cervical spine were acquired without  intravenous contrast. Multiplanar reformations were created.        Impression    IMPRESSION: Mild degenerative retrolisthesis of C3 upon C4. Alignment  of the cervical vertebrae is otherwise normal.  Vertebral body heights  of the cervical spine are normal. Craniocervical alignment is normal.  There are no fractures of the cervical spine. Mild facet arthropathy  throughout the cervical spine. No high-grade spinal canal stenosis. No  prevertebral soft tissue swelling.      Radiation dose for this scan was reduced using automated exposure  control, adjustment of the mA and/or kV according to patient size, or  iterative reconstruction technique    GURINDER DAVIS MD         SYSTEM ID:  [id number]   MR Brain w/o & w Contrast    Narrative    EXAM: MR BRAIN W/O and W CONTRAST  LOCATION: [facility]  DATE: 3/26/2024    INDICATION: Seizure.  COMPARISON: Head CT 03/26/2024  CONTRAST: 9ml gadavist  TECHNIQUE: Routine multiplanar multisequence head MRI without and with intravenous contrast.    FINDINGS:  There is no restricted diffusion. Mild mucosal thickening in the sinuses. Mastoid air cells appear free from significant disease. Intraorbital contents are unremarkable. There is mild generalized prominence of the sulci and ventricles, consistent with   underlying volume loss. Intracranial flow voids are intact. There is no mass effect, midline shift, or extraaxial collection. Signal intensity of the brain parenchyma is within normal limits for the patient's age. No evidence for acute or chronic   intracranial blood products. Scalp swelling.      Impression    IMPRESSION:  1.  No acute intracranial finding. No evidence for recent ischemia, intracranial hemorrhage, or mass.  2.  Mild generalized volume loss.

## 2024-03-27 NOTE — CONSULTS
[facility]  Neurology Consultation    Patient Name:  Adama Cid  MRN:  [id number]    :  1965  Date of Service:  2024  Primary care provider:  Hernan Connell      Neurology consultation service was asked to see Adama Cid by Dr. Benjamin to evaluate for first time seizure.    Chief Complaint:  Seizure, loss of consciousness    History of Present Illness:   Adama Cid is a 58 year old male with history of ankylosing spondylitis, hypertension, tobacco use disorder, suspected alcohol use disorder who presents after a suspected generalized tonic-clonic seizure.  Patient was at home when his wife heard a thud in another room.  She came to find the patient on the floor with a generalized convulsion.  His lactic acid was initially elevated, then quickly normalized and patient was initially encephalopathic in the emergency department but returned to baseline after some number of hours later.     Usually drinks 6 beers a day and had his last drink 48 hours prior to presenting to the emergency department.    Chart review showed no prior visits to outpatient neurology and no inpatient neurology visits.  He has not had any past seizures recorded in the medical system.       I gathered additional history from the patient today at bedside and additional collateral history from the patient's sister who was at bedside.  They both describe that he has been drinking for many years and he is not interested in alcohol cessation.  He typically drinks for some days at a time, and then stops.  In the past he has noticed hand tremors after alcohol cessation, though he has had no withdrawal seizures and no other seizures in the past including as a child.  They both deny any family history of seizure disorder.  He has no history of meningitis, brain surgery, or stroke.    Regarding his neuropathy, he describes he was aware of tingling in the feet that started about 3 years  ago and he has noticed difficulty walking over the past year.  He says he avoids walking up ladders, has difficulty with stairs, and he has not been working for several years.  He has had some numbness in the bilateral index fingers, though these are both associated with blistering of his skin.  He denies numbness of any of the other fingers, or any weakness of his hands.  He describes that the numbness in his feet does not extend above his ankles.      ROS  A comprehensive ROS was performed and pertinent findings were included in HPI.     PMH  Past Medical History:   Diagnosis Date    Hypertension 6/6/2017     Past Surgical History:   Procedure Laterality Date    [facility] TOTAL KNEE ARTHROPLASTY         Medications   I have personally reviewed the patient's medication list.     Allergies  I have personally reviewed the patient's allergy list.     Social History  Reviewed, described above    Family History    Reviewed, described above in H&P      Physical Examination   Vitals: /85 (BP Location: Left arm)   Pulse 70   Temp 98.2  F (36.8  C)   SpO2 97%   General: Sitting up in chair, eating lunch  Head: Large abrasion over the forehead, bruising surrounding the bilateral eyes, bilateral lateral tongue bites.  Eyes: no icterus, op pink and moist  Cardiac: RRR. Extremities warm, no edema.   Respiratory: non-labored on RA  GI: S/NT/ND  Skin: No rash or lesion on exposed skin  Psych: Mood pleasant, affect congruent  Neuro:  Mental status: Awake, alert, attentive, oriented to self, time, place, and circumstance. Language is fluent and coherent with intact comprehension of complex commands, naming and repetition.  Cranial nerves: VFF, PERRL, conjugate gaze, EOMI, facial sensation intact, face symmetric, shoulder shrug strong, tongue/uvula midline, no dysarthria.   Motor: Normal bulk and tone. No abnormal movements. 5/5 strength bilaterally in deltoids, biceps, triceps, hand , hip flexors, hip extensors, knee  flexion, knee extension, plantarflexion, dorsiflexion.   He does have bilateral contractures of the fourth fingers.  Reflexes: Reflexes are absent at the Achilles and patella, trace reflexes at the biceps and brachioradialis.  Sensory: Reduced sensation to vibration and temperature at the feet, normalizing at the mid shins.  Coordination: FNF and HS without ataxia or dysmetria. Rapid alternating movements intact.   Gait: Wide-based gait, prefers to hold onto objects for support    Investigations   I have personally reviewed pertinent labs, tests, and radiological imaging. Discussion of notable findings is included under Impression.     CT head 3/26 showing a scalp hematoma, cerebral volume loss, and white matter changes consistent with small vessel ischemic disease, no acute intracranial pathology.    MRI brain performed showing no acute intracranial pathology    CT C spine 3/26/24  IMPRESSION: Mild degenerative retrolisthesis of C3 upon C4. Alignment  of the cervical vertebrae is otherwise normal. Vertebral body heights  of the cervical spine are normal. Craniocervical alignment is normal.  There are no fractures of the cervical spine. Mild facet arthropathy  throughout the cervical spine. No high-grade spinal canal stenosis. No  prevertebral soft tissue swelling.    Was patient transferred from outside hospital?   Yes - I have personally reviewed pertinent notes, labs, and images (if available) from outside hospital.    Impression  #Alcohol use disorder  #Alcohol withdrawal  #Provoked seizure secondary to above  #Length-dependent symmetrical sensory neuropathy    Mr. Cid is a 58-year-old man who had a provoked seizure in the setting of alcohol withdrawal.  He has no past medical history to suggest an increased risk of seizures, or a primary epilepsy.  Since this was his first seizure, and was provoked, there is no indication for further workup other than what has already been performed.    I have reviewed his  head CT and MRI brain which did not show acute pathology.    Recommendations  -No indications for antiepileptic therapy  - No indication for inpatient EEG  - I discussed seizure precautions with the patient and his sister including that he is not allowed to drive for the next 3 months according to [place] state law.  Also recommended avoiding other activities that could cause severe harm or death if he were to lose consciousness or have a seizure.  This includes things like working with power tools, climbing ladders, swimming, etc.    Thank you for involving Neurology in the care of Adama Cid.  Please do not hesitate to call with questions.     Juan M Garcia MD    Billed as a level 4 visit due to patient presenting with an acute condition leading to severe loss of bodily function.  I have reviewed imaging, reviewed labs including hyponatremia, normal CBC, unremarkable urinalysis.  I have reviewed the notes from the emergency department and the primary team's H&P.

## 2024-03-27 NOTE — CARE PLAN
End of shift summary:  See flowsheets for VS and assessment data       Pt is a/o x2, calm, and cooperative with staff and medication administration. Reoriented to call light, room and food menu.     CIWA score 4.     Skin: Laceration on head with 9 staples. Abrasions on forehead.  Admitted with nicotine patch on right shoulder.      IV: NS infusing      Pain:Pt reports chronic back pain rated at 4/10. Scheduled Tylenol administered.     Pulmonary: WNL.     GI/: Pt reports last BM yesterday. Pt has bedside urinal. Has not voided as of yet.     Seizure pads in place. Bed alarm on.     Labs for mag, potassium and phos orders placed. AM staff to be updated to look out for results.

## 2024-03-27 NOTE — DISCHARGE SUMMARY
"[facility]  Discharge Summary - Medicine & Pediatrics       Date of Admission:  3/27/2024  Date of Discharge:  3/27/2024  4:18 PM  Discharging Provider: Kenya Benjamin DO  Discharge Service: [facility]    Discharge Diagnoses   Seizure  Fall with trauma to head  Scalp laceration   Alcohol use with sequelae   Hyponatremia     Clinically Significant Risk Factors     # Obesity: Estimated body mass index is 30.68 kg/m  as calculated from the following:    Height as of 3/26/24: 1.803 m (5' 11\").    Weight as of 3/26/24: 99.8 kg (220 lb).       Follow-ups Needed After Discharge   Follow-up Appointments     Adult [facility] Follow-up and recommended labs and tests      Establish with new primary care provider. Referral placed at discharge.   Should be seen in 7-10 days if possible.    Referrals placed for rheumatology ([facility]) and   neurology (unfortunately, not available at clinics near your home, will   likely need to be in the [facility].) You should receive calls to   schedule these referrals. If you do not hear from them, you should call   the numbers listed in this packet.     Appointments on [place] and/or [facility] (with [facility] or [facility]   provider or service). Call [phone] if you haven't heard regarding   these appointments within 7 days of discharge.        New PCP To-dos:  - Establish care  - Explore alcohol use,  and manage as appropriate  - Remove staples from head lac when indicated     Discharge Disposition   Discharged to home  Condition at discharge: Stable    Hospital Course   Adama Cid is a 58 year old man with a history of ankylosing spondylitis who was admitted on 3/27/2024 for weakness (requiring assist of 2 in ED to stand up) in the setting of recent unwitnessed fall and likely seizure, possibly attributable to alcohol withdrawal.  The following problems were addressed during his " "hospitalization:    #Unwitnessed fall  #Likely first seizure, possibly provoked by alcohol withdrawal  #Lactic acidosis, resolved  #Elevated CK   #Weakness   Evaluated by inpatient neurologist who determined that Adama had experienced a provoked seizure in the setting of alcohol withdrawal. This was his first seizure, and he does not have any family history of epilepsy. Noted that there are no indications for antiepileptic therapy, inpatient EEG, or further neurological workup. Noted seizure precautions and requirement to not drive for the next 3 months. Script for keppra initially sent given anticipated outpatient neurology consultation; cancelled following inpatient neurology consultation. Outpatient referral sent, though Adama may not need to follow with outpatient neurologist anymore. Primarily admitted for weakness (requiring assist of 2 in ED to stand up.) Adama has chronic functional impairment due to his ankylosing spondylitis, and endorses being at his baseline. Assessed by PT while admitted, and felt to be safe to discharge home with assist. Suggested to use elevated toilet seat and shower chair, which his sister plans to secure for him on discharge.     #Alcohol use with sequelae  #Possible alcohol withdrawal   Adama \"likes beer,\" and will often drink a 6-pack in one day, sometimes 2 on football days. Last drink 2-3 days prior to presentation. He does not feel that his alcohol use is problematic, and he is not interested in assistance with sobriety or decreasing alcohol intake at this time. CIWA scores between 11-1 during admission. Received lorazepam 1mg x1 per protocol. Will benefit from continued management with primary care provider. New referral placed on discharge.     #Scalp hematoma  Right parietal scalp laceration repaired at OSH with 9 staples 3/27. Will need to follow with new primary care provider for management and surveillance of healing process, as well as staple removal on or around 4/4. "     #Hypochloremic hyponatremia  #Hypophosphatemia  #Hypermagnesemia   Electrolyte disturbances attributable to dehydration with alcohol use, recent seizure. Managed on electrolyte replacement protocols and maintenance fluids during 1-day admission. Improved on repeat labs.     #Ankylosing Spondylitis  Contributing to poor functional status (for which he was admitted.) Managed outpatient with naproxen. Has not worked with rheumatology for several years given unaffordable specialized medications. Notes he has better insurance now, and would like to see a rheumatologist. New referral placed on discharge.     Consultations This Hospital Stay   CARE MANAGEMENT / SOCIAL WORK IP CONSULT  NEUROLOGY GENERAL ADULT IP CONSULT  WOUND OSTOMY CONTINENCE NURSE  IP CONSULT  PHYSICAL THERAPY ADULT IP CONSULT  OCCUPATIONAL THERAPY ADULT IP CONSULT    Code Status   Full Code       The patient was discussed with MD TREVER Morales MD  [facility]  [facility] MED SURG  [address] [place] [id number]  Phone: [phone]  Fax: [phone]  ______________________________________________________________________    Physical Exam   Vital Signs: Temp: 98.7  F (37.1  C) Temp src: Oral BP: (!) 125/98 Pulse: 89   Resp: 18 SpO2: 100 % O2 Device: None (Room air)    Weight: 0 lbs 0 oz  Physical Exam  Constitutional:       General: He is not in acute distress.     Appearance: Normal appearance.      Comments: Skin abrasions on face   Cardiovascular:      Rate and Rhythm: Normal rate and regular rhythm.   Pulmonary:      Effort: Pulmonary effort is normal.      Breath sounds: Normal breath sounds.   Neurological:      General: No focal deficit present.      Mental Status: He is alert.      Comments: No tongue fasciculations noted. Tremor not noted on initial exam, witnessed on subsequent exam.          Primary Care Physician   Hernan Connell    Discharge Orders       Primary Care Referral      Adult Rheumatology  Referral      Reason for your hospital stay    Evaluation following fall and likely seizure. Seizure possibly due to alcohol withdrawal, or possibly unrelated.     Adult [facility] Follow-up and recommended labs and tests    Establish with new primary care provider. Referral placed at discharge. Should be seen in 7-10 days if possible.    Referrals placed for rheumatology ([facility]) and neurology (unfortunately, not available at clinics near your home, will likely need to be in the [facility].) You should receive calls to schedule these referrals. If you do not hear from them, you should call the numbers listed in this packet.     Appointments on [place] and/or [facility] (with [facility] or [facility] provider or service). Call [phone] if you haven't heard regarding these appointments within 7 days of discharge.     Activity    Your activity upon discharge: activity as tolerated. Use elevated toilet seat and shower chair as suggested by PT.     Diet    Follow this diet upon discharge: Orders Placed This Encounter      Combination Diet Regular Diet Adult       Significant Results and Procedures   Results for orders placed or performed during the hospital encounter of 03/26/24   Head CT w/o contrast    Narrative    CT OF THE HEAD WITHOUT CONTRAST March 26, 2024 2:58 PM     HISTORY: Ground level fall w/scalp laceration, rule out seizure like  activity without history of seizure.    TECHNIQUE: 5 mm thick axial CT images of the head were acquired  without IV contrast material. Radiation dose for this scan was reduced  using automated exposure control, adjustment of the mA and/or kV  according to patient size, or iterative reconstruction technique.    COMPARISON: None.    FINDINGS: There is mild diffuse cerebral volume loss. There are subtle  patchy areas of decreased density in the cerebral white matter  bilaterally that are consistent with  sequela of chronic small vessel  ischemic disease.    The ventricles and basal cisterns are within normal limits in  configuration given the degree of cerebral volume loss. There is no  midline shift. There are no extra-axial fluid collections.     No intracranial hemorrhage, mass or recent infarct.    The visualized paranasal sinuses are well-aerated. There is no  mastoiditis. There are no fractures of the visualized bones. There is  a moderate-sized midline forehead scalp hematoma.      Impression    IMPRESSION: Moderate size midline forehead scalp hematoma. Diffuse  cerebral volume loss and cerebral white matter changes consistent with  chronic small vessel ischemic disease. No evidence for acute  intracranial pathology.            GURINDER DAVIS MD         SYSTEM ID:  [id number]   CT Cervical Spine w/o Contrast    Narrative    CT OF THE CERVICAL SPINE WITHOUT CONTRAST   3/26/2024 2:59 PM     COMPARISON: None    HISTORY: Neck pain in setting of fall and possible seizure.     TECHNIQUE: Axial images of the cervical spine were acquired without  intravenous contrast. Multiplanar reformations were created.        Impression    IMPRESSION: Mild degenerative retrolisthesis of C3 upon C4. Alignment  of the cervical vertebrae is otherwise normal. Vertebral body heights  of the cervical spine are normal. Craniocervical alignment is normal.  There are no fractures of the cervical spine. Mild facet arthropathy  throughout the cervical spine. No high-grade spinal canal stenosis. No  prevertebral soft tissue swelling.      Radiation dose for this scan was reduced using automated exposure  control, adjustment of the mA and/or kV according to patient size, or  iterative reconstruction technique    GURINDER DAVIS MD         SYSTEM ID:  [id number]   MR Brain w/o & w Contrast    Narrative    EXAM: MR BRAIN W/O and W CONTRAST  LOCATION: [facility]  DATE: 3/26/2024    INDICATION: Seizure.  COMPARISON: Head  CT 03/26/2024  CONTRAST: 9ml gadavist  TECHNIQUE: Routine multiplanar multisequence head MRI without and with intravenous contrast.    FINDINGS:  There is no restricted diffusion. Mild mucosal thickening in the sinuses. Mastoid air cells appear free from significant disease. Intraorbital contents are unremarkable. There is mild generalized prominence of the sulci and ventricles, consistent with   underlying volume loss. Intracranial flow voids are intact. There is no mass effect, midline shift, or extraaxial collection. Signal intensity of the brain parenchyma is within normal limits for the patient's age. No evidence for acute or chronic   intracranial blood products. Scalp swelling.      Impression    IMPRESSION:  1.  No acute intracranial finding. No evidence for recent ischemia, intracranial hemorrhage, or mass.  2.  Mild generalized volume loss.         Discharge Medications   Discharge Medication List as of 3/27/2024  3:37 PM        CONTINUE these medications which have NOT CHANGED    Details   naproxen sodium (ALEVE) 220 MG capsule Take 220 mg by mouth daily as needed., 220 mg, Oral, DAILY PRN, Until Discontinued, Historical           STOP taking these medications       levETIRAcetam (KEPPRA) 500 MG tablet Comments:   Reason for Stopping:         Misc Natural Products (OSTEO BI-FLEX ADV DOUBLE ST) TABS Comments:   Reason for Stopping:         piroxicam (FELDENE) 20 MG capsule Comments:   Reason for Stopping:             Allergies   No Known Allergies

## 2024-03-27 NOTE — PROGRESS NOTES
03/27/24 1121   Appointment Info   Signing Clinician's Name / Credentials (PT) Silvia Ruiz DPT       Present no   Language English   Living Environment   People in Home significant other   Current Living Arrangements house   Home Accessibility stairs to enter home   Number of Stairs, Main Entrance 3   Stair Railings, Main Entrance railings safe and in good condition;railing on right side (ascending)   Transportation Anticipated family or friend will provide   Living Environment Comments Lives with a significant other who is there all the time. Has stairs to basement but he does not go down them   Self-Care   Usual Activity Tolerance moderate   Current Activity Tolerance moderate   Regular Exercise No   Equipment Currently Used at Home none  (has a walker at home)   Fall history within last six months yes   Number of times patient has fallen within last six months 2   General Information   Onset of Illness/Injury or Date of Surgery 03/27/24   Referring Physician Juan M Cosme MD   Patient/Family Therapy Goals Statement (PT) To discharge today   Pertinent History of Current Problem (include personal factors and/or comorbidities that impact the POC) 58 year old male with a past medical history of ankylosing spondylosis, HTN, Tobacco use disorder admitted on 3/27/2024 for fall, scalp hematoma and seizure like activity concerning for alcohol withdrawal seizure.   Existing Precautions/Restrictions fall   Weight-Bearing Status - LUE full weight-bearing   Weight-Bearing Status - RUE full weight-bearing   Weight-Bearing Status - LLE full weight-bearing   Weight-Bearing Status - RLE full weight-bearing   General Observations Sitting up in recliner with sister present on arrival   Cognition   Affect/Mental Status (Cognition) WNL   Orientation Status (Cognition) oriented x 3   Follows Commands (Cognition) WNL   Pain Assessment   Patient Currently in Pain Yes, see Vital Sign flowsheet    Integumentary/Edema   Integumentary/Edema Comments Patient with laceration to skull, multiple other bruises present   Posture    Posture Forward head position;Protracted shoulders;Kyphosis   Posture Comments Mild to moderate sitting EOB and standing   Range of Motion (ROM)   ROM Comment Did not formally assess, demonstrates functional ROM with mobility   Strength (Manual Muscle Testing)   Strength Comments Did not formally assess, demonstrates functional strength with mobility   Bed Mobility   Comment, (Bed Mobility) Did not assess, anticipate no issues. Up in recliner on arrival   Transfers   Comment, (Transfers) Independent sit<>stand transfer   Gait/Stairs (Locomotion)   Comment, (Gait/Stairs) Supervision level ambulation both pushing IV pole and without. Very mild instability noted   Balance   Balance Comments Independent sitting balance, supervision standing balance   Sensory Examination   Sensory Perception Comments Baseline neuropathy   Clinical Impression   Criteria for Skilled Therapeutic Intervention Yes, treatment indicated   PT Diagnosis (PT) impaired tolerance for functional mobility   Influenced by the following impairments some generalized weakness, increased back pain, neuropathy, impaired standing balance   Functional limitations due to impairments impaired tolerance for functional mobility   Clinical Presentation (PT Evaluation Complexity) stable   Clinical Presentation Rationale Per clinical judgment   Clinical Decision Making (Complexity) low complexity   Planned Therapy Interventions (PT) balance training;bed mobility training;gait training;home exercise program;stair training;strengthening;transfer training;progressive activity/exercise;risk factor education;home program guidelines   Risk & Benefits of therapy have been explained evaluation/treatment results reviewed;care plan/treatment goals reviewed;risks/benefits reviewed;current/potential barriers reviewed   PT Total Evaluation Time   PT  Eval, Low Complexity Minutes (92284) 6   Physical Therapy Goals   PT Frequency Daily   PT Predicted Duration/Target Date for Goal Attainment 03/30/24   PT Goals Bed Mobility;Gait;Stairs;PT Goal 1   PT: Bed Mobility Independent;Supine to/from sit   PT: Gait Modified independent;Assistive device;150 feet   PT: Stairs Supervision/stand-by assist;3 stairs;Rail on right   PT: Goal 1 Score appropriate on DGI to demonstrate decreased risk of falls   Therapeutic Activity   Therapeutic Activities: dynamic activities to improve functional performance Minutes (43177) 15   Symptoms Noted During/After Treatment Fatigue;Increased pain   Treatment Detail/Skilled Intervention PT: Up in recliner upon arrival, agreeable to PT. Independent transfers without an AD. Patient tolerated ambulating in [place]aine with supervision initially pushing IV pole then able without IV pole for 50' of ambulation. Patient ambulated x 300' total. Endorsed being at baseline with ambulation but does have some slight instability. Patient does have walker and home, educated on use of walker on return home for improved safety- in agreement. Educated on toilet and shower transfers as endorses he has been struggling with this. Recommended raised toilet seat and possible tub transfer bench (has been sponge bathing). Sister plans to look into this for the patient, felt it was a good idea for home. Ended in recliner with needs in reach.   PT Discharge Planning   PT Plan PT: Gait with walker, stairs per home set up, possible DGI   PT Discharge Recommendation (DC Rec) home with assist   PT Rationale for DC Rec PT: Anticipate patient will be safe to return home with PRN assist from significant other and sister. Per patient, very near baseline   PT Brief overview of current status PT: Independent transfers, supervision ambulation   Total Session Time   Timed Code Treatment Minutes 15   Total Session Time (sum of timed and untimed services) 21

## 2024-03-27 NOTE — PROGRESS NOTES
"Transfer Type: [facility]  Transfer Triage Note    Date of call: 03/26/24  Time of call: 10:43 PM    Current Patient Location:  [facility]  Current Level of Care: ED    Vitals: Temp: 98.3  F (36.8  C) Temp src: Oral BP: 135/85 Pulse: 72   Resp: 18 SpO2: 96 % Height: 180.3 cm (5' 11\") Weight: 99.8 kg (220 lb)    Diagnosis: seizure, fall and head trauma  Reason for requested transfer: Procedure can be done here and not at referring hospital   Isolation Needs: None    Care everywhere has been updated and reviewed: Yes  Necessary images have been sent through PACS: Yes    If patient is transferring for specialty care or specific procedure, the specialist required has participated in the transfer call and agreed with need for transfer and anticipated timeline: No    Transfer accepted: Yes  Stability of Patient: Patient is vitally stable, with no critical labs, and will likely remain stable throughout the transfer process  Is the patient appropriate for [facility]? Yes  Level of Care Needed: Med Surg  Telemetry Needed:  Med (Remote) Telemetry  Expected Time of Arrival for Transfer: 0-8 hours  Arrival Location:  [facility]     Recommendations for Management and Stabilization: Not needed    Additional Comments: PMhx of HTN, tobacco use and Hx of alcohol withdrawal in 2017 who presents with a seizure. Per wife, she heard a thud and went to investigate when she saw the patient convulsing on the floor.   Patient had  CT head, C-spine that were unremarkable. Does drink 6-12 beers/ week and did not have any in the past 40 hours PTA. Per ER doc, there 's no evidence of alcohol withdrawal while in the ER- he is on CIWA however  ER spoke with Neuro , who suggested discharge with OP EEG and Neuro appt  Patient requested Keppra which neuro team thought was reasonable  However, patient needed a 2-person assist to stand-up. So, he cannot discharge. So, he " is being admitted for observation for alcohol withdrawal, seizure monitoring, and also possible neuro c/s     Josh Ospina MD

## 2024-03-27 NOTE — PLAN OF CARE
Occupational Therapy: Orders received. Chart reviewed and discussed with care team.? Occupational Therapy not indicated due to pt near baseline for ADL, up SBA with IV pole. Pt has noted difficulty with tub transfers and toilet transfers, has been educated in tub bench and RTS and will look into purchasing. PT can follow for endurance.? Defer discharge recommendations to physical therapy, medical.? Will complete orders.

## 2024-03-27 NOTE — PLAN OF CARE
End of shift Summary: See flowsheet for VS and detail assessments.     Changes this Shift:     Neuro/CMS: A&Ox3. Disoriented with time. Calm and cooperative. Denies dizziness headaches and N/V. Pt has baseline N/T on BLE.      Pulmonary: On RA. LS clear and equal bilaterally. Denies cough, chest pain and SOB.      Output: Continent of bowel/bladder. Voids spontaneously in urinal. Pt did had BM this shift.      Activity: Ambulates to the bathroom with SBA     Skin: Laceration on head with 9 staples. Abrasions on forehead. Nicotine patch on R shoulder.      Pain: Rates back pain 3/10; managed with scheduled tylenol.      IV: R PIV running NS 125ml/hr. Discontinued for discharge    Additional info: CIWA score 1     Plan: Plan to discharge home today.     Pt. discharged at 1600 to home, and left with personal belongings. Pt. received complete discharge paperwork. Pt. to follow up with [facility] in Jul 16. Pt. had no further questions at the time of discharge and no unmet needs were identified.

## 2024-03-28 ENCOUNTER — PATIENT OUTREACH (OUTPATIENT)
Dept: FAMILY MEDICINE | Facility: CLINIC | Age: 59
End: 2024-03-28
Payer: COMMERCIAL

## 2024-03-28 NOTE — PLAN OF CARE
Physical Therapy Discharge Summary    Reason for therapy discharge:    Discharged to home.    Progress towards therapy goal(s). See goals on Care Plan in [facility] electronic health record for goal details.  Goals partially met.  Barriers to achieving goals:   discharge on same date as initial evaluation.    Therapy recommendation(s):    No further therapy is recommended. Rec home w/ assist for increased safety.

## 2024-03-28 NOTE — TELEPHONE ENCOUNTER
What type of discharge? Emergency Department  Risk of Hospital admission or ED visit: 41.9%  Is a TCM episode required? No  When should the patient follow up with PCP? 14 days of discharge.

## 2024-03-29 ENCOUNTER — TELEPHONE (OUTPATIENT)
Dept: FAMILY MEDICINE | Facility: CLINIC | Age: 59
End: 2024-03-29
Payer: COMMERCIAL

## 2024-03-29 NOTE — TELEPHONE ENCOUNTER
Patient called due to needing staples removed. Per ED note on 6/26/24 patient needs to be seen by a PCP and establish care.  Patient scheduled 4/4/24 with [facility].    Eladia Bridges RN on 3/29/2024 at 1:09 PM

## 2024-04-04 ENCOUNTER — OFFICE VISIT (OUTPATIENT)
Dept: FAMILY MEDICINE | Facility: CLINIC | Age: 59
End: 2024-04-04
Payer: COMMERCIAL

## 2024-04-04 ENCOUNTER — LAB (OUTPATIENT)
Dept: LAB | Facility: CLINIC | Age: 59
End: 2024-04-04
Payer: COMMERCIAL

## 2024-04-04 VITALS
BODY MASS INDEX: 29.96 KG/M2 | SYSTOLIC BLOOD PRESSURE: 128 MMHG | OXYGEN SATURATION: 100 % | WEIGHT: 214 LBS | DIASTOLIC BLOOD PRESSURE: 82 MMHG | HEIGHT: 71 IN | RESPIRATION RATE: 18 BRPM | HEART RATE: 72 BPM | TEMPERATURE: 97.5 F

## 2024-04-04 DIAGNOSIS — M45.8 ANKYLOSING SPONDYLITIS OF SACRAL REGION (H): ICD-10-CM

## 2024-04-04 DIAGNOSIS — R20.0 NUMBNESS IN FEET: ICD-10-CM

## 2024-04-04 DIAGNOSIS — Z12.11 SCREENING FOR MALIGNANT NEOPLASM OF COLON: ICD-10-CM

## 2024-04-04 DIAGNOSIS — E83.51 HYPOCALCEMIA: ICD-10-CM

## 2024-04-04 DIAGNOSIS — E55.9 VITAMIN D DEFICIENCY: ICD-10-CM

## 2024-04-04 DIAGNOSIS — S01.91XD LACERATION OF HEAD WITHOUT FOREIGN BODY, UNSPECIFIED PART OF HEAD, SUBSEQUENT ENCOUNTER: ICD-10-CM

## 2024-04-04 DIAGNOSIS — R56.9 SEIZURE (H): Primary | ICD-10-CM

## 2024-04-04 LAB
ANION GAP SERPL CALCULATED.3IONS-SCNC: 9 MMOL/L (ref 7–15)
BUN SERPL-MCNC: 5.1 MG/DL (ref 6–20)
CALCIUM SERPL-MCNC: 9.1 MG/DL (ref 8.6–10)
CHLORIDE SERPL-SCNC: 102 MMOL/L (ref 98–107)
CREAT SERPL-MCNC: 0.78 MG/DL (ref 0.67–1.17)
CRP SERPL-MCNC: 10.98 MG/L
DEPRECATED HCO3 PLAS-SCNC: 27 MMOL/L (ref 22–29)
EGFRCR SERPLBLD CKD-EPI 2021: >90 ML/MIN/1.73M2
ERYTHROCYTE [DISTWIDTH] IN BLOOD BY AUTOMATED COUNT: 15.6 % (ref 10–15)
ERYTHROCYTE [SEDIMENTATION RATE] IN BLOOD BY WESTERGREN METHOD: 19 MM/HR (ref 0–20)
FERRITIN SERPL-MCNC: 196 NG/ML (ref 31–409)
FOLATE SERPL-MCNC: 8.5 NG/ML (ref 4.6–34.8)
GLUCOSE SERPL-MCNC: 84 MG/DL (ref 70–99)
HCT VFR BLD AUTO: 47.9 % (ref 40–53)
HGB BLD-MCNC: 15.4 G/DL (ref 13.3–17.7)
IRON BINDING CAPACITY (ROCHE): 278 UG/DL (ref 240–430)
IRON SATN MFR SERPL: 22 % (ref 15–46)
IRON SERPL-MCNC: 60 UG/DL (ref 61–157)
MCH RBC QN AUTO: 29.4 PG (ref 26.5–33)
MCHC RBC AUTO-ENTMCNC: 32.2 G/DL (ref 31.5–36.5)
MCV RBC AUTO: 91 FL (ref 78–100)
PLATELET # BLD AUTO: 245 10E3/UL (ref 150–450)
POTASSIUM SERPL-SCNC: 4.8 MMOL/L (ref 3.4–5.3)
RBC # BLD AUTO: 5.24 10E6/UL (ref 4.4–5.9)
SODIUM SERPL-SCNC: 138 MMOL/L (ref 135–145)
TSH SERPL DL<=0.005 MIU/L-ACNC: 3.88 UIU/ML (ref 0.3–4.2)
WBC # BLD AUTO: 10 10E3/UL (ref 4–11)

## 2024-04-04 PROCEDURE — 80048 BASIC METABOLIC PNL TOTAL CA: CPT

## 2024-04-04 PROCEDURE — 83540 ASSAY OF IRON: CPT

## 2024-04-04 PROCEDURE — 84425 ASSAY OF VITAMIN B-1: CPT | Mod: 90

## 2024-04-04 PROCEDURE — 82306 VITAMIN D 25 HYDROXY: CPT

## 2024-04-04 PROCEDURE — 99000 SPECIMEN HANDLING OFFICE-LAB: CPT

## 2024-04-04 PROCEDURE — 84443 ASSAY THYROID STIM HORMONE: CPT

## 2024-04-04 PROCEDURE — 82746 ASSAY OF FOLIC ACID SERUM: CPT

## 2024-04-04 PROCEDURE — 82607 VITAMIN B-12: CPT

## 2024-04-04 PROCEDURE — 86140 C-REACTIVE PROTEIN: CPT

## 2024-04-04 PROCEDURE — 36415 COLL VENOUS BLD VENIPUNCTURE: CPT

## 2024-04-04 PROCEDURE — 85652 RBC SED RATE AUTOMATED: CPT

## 2024-04-04 PROCEDURE — 82728 ASSAY OF FERRITIN: CPT

## 2024-04-04 PROCEDURE — 99495 TRANSJ CARE MGMT MOD F2F 14D: CPT | Performed by: NURSE PRACTITIONER

## 2024-04-04 PROCEDURE — 83550 IRON BINDING TEST: CPT

## 2024-04-04 PROCEDURE — 85027 COMPLETE CBC AUTOMATED: CPT

## 2024-04-04 NOTE — PROGRESS NOTES
"  Assessment & Plan     Seizure (H)    - Adult Neurology  Referral; Future    Ankylosing spondylitis of sacral region (H)  Has Rheumatology appt in July  - Spine  Referral; Future  - ESR: Erythrocyte sedimentation rate; Future  - CRP, inflammation; Future    Numbness in feet  Unclear- neuropathy vs radicular from spine vs vitamin deficiency vs other  - Spine  Referral; Future  - Adult Neurology  Referral; Future  - TSH with free T4 reflex; Future  - Vitamin B12; Future  - Folate; Future  - Vitamin B1 whole blood; Future  - Ferritin; Future  - Iron and iron binding capacity; Future  - CBC with platelets; Future  - Vitamin D Deficiency; Future  - Basic metabolic panel  (Ca, Cl, CO2, Creat, Gluc, K, Na, BUN); Future    Hypocalcemia    - calcium carbonate (OS-SEBASTIÁN) 1500 (600 Ca) MG tablet; Take 1 tablet (600 mg) by mouth 2 times daily (with meals)    Laceration of head without foreign body, unspecified part of head, subsequent encounter    - REMOVAL OF SUTURES    Screening for malignant neoplasm of colon    - Colonoscopy Screening  Referral; Future          MED REC REQUIRED  Post Medication Reconciliation Status: discharge medications reconciled, continue medications without change  Nicotine/Tobacco Cessation  He reports that he has been smoking cigarettes. He has been smoking an average of 1.5 packs per day. He has never used smokeless tobacco.  Nicotine/Tobacco Cessation Plan  Information offered: Patient not interested at this time      BMI  Estimated body mass index is 29.85 kg/m  as calculated from the following:    Height as of this encounter: 1.803 m (5' 11\").    Weight as of this encounter: 97.1 kg (214 lb).   Weight management plan: Discussed healthy diet and exercise guidelines      FUTURE APPOINTMENTS:       - Follow-up for annual visit or as needed. Schedule consults with Spine, Neurology and Rheumatology. Labs pending. + HLA B27 lab discussed with them from " "2010.    See Patient Instructions    Subjective   Adama is a 58 year old, presenting for the following health issues:  Hospital F/U        4/4/2024    12:51 PM   Additional Questions   Roomed by Mary MCGRAW     [facility] [place]         Hospital Follow-up Visit:    Hospital/Nursing Home/IP Rehab Facility: [facility]  Date of Admission: 3/26/24  Date of Discharge: 3/27/24  Reason(s) for Admission: seizure     Was your hospitalization related to COVID-19? No   Problems taking medications regularly:  None  Medication changes since discharge: None  Problems adhering to non-medication therapy:  None    Summary of hospitalization:  [facility] discharge summary reviewed  Diagnostic Tests/Treatments reviewed.  Follow up needed: staple removal  Other Healthcare Providers Involved in Patient s Care:         Specialist appointment - Rheumatology, Neurology  Update since discharge: stable.         Plan of care communicated with patient and family           Patient reports drinking a few beers a night when he has extra money to do that and not drinking when he doesn't. He has never had a seizure. States he has very bad neuropathy in both of his feet that makes it hard to walk, cannot do stairs. Hx of ankylosing spondylitis although they are unaware of this diagnosis. Patient states he thinks he fell into the corner of the dresser and then had the seizure. Has not had neuropathy work up. Does report low back pain- chronic.       Review of Systems  Constitutional, HEENT, cardiovascular, pulmonary, gi and gu systems are negative, except as otherwise noted.      Objective    /82   Pulse 72   Temp 97.5  F (36.4  C) (Tympanic)   Resp 18   Ht 1.803 m (5' 11\")   Wt 97.1 kg (214 lb)   SpO2 100%   BMI 29.85 kg/m    Body mass index is 29.85 kg/m .  Physical Exam   GENERAL: alert and no distress  NECK: no adenopathy, no asymmetry, masses, or scars  RESP: lungs clear to auscultation - " no rales, rhonchi or wheezes  CV: regular rate and rhythm, normal S1 S2, no S3 or S4, no murmur, click or rub, no peripheral edema  MS: no gross musculoskeletal defects noted, no edema  SKIN- 9 staples to right scalp, lac well approximated, no edema, no erythema, no drainage.  NEURO: sensory deficit - bilateral lower legs and feet and mentation intact  PSYCH: mentation appears normal, affect normal/bright        9 staples removed in whole with staple remover, no bleeding, patient tolerated well.     Signed Electronically by: LILLIAM Swartz CNP

## 2024-04-04 NOTE — LETTER
April 11, 2024      Adama iCd  [id number] [facility] [address] [facility] [facility] [address]        Dear ,    We are writing to inform you of your test results.    Normal thiamine level.     Resulted Orders   TSH with free T4 reflex   Result Value Ref Range    TSH 3.88 0.30 - 4.20 uIU/mL   Vitamin B12   Result Value Ref Range    Vitamin B12 521 232 - 1,245 pg/mL   Folate   Result Value Ref Range    Folic Acid 8.5 4.6 - 34.8 ng/mL   Vitamin B1 whole blood   Result Value Ref Range    Vitamin B1 Whole Blood Level 167 70 - 180 nmol/L      Comment:      INTERPRETIVE INFORMATION: Vitamin B1, Whole Blood    This assay measures the concentration of thiamine   diphosphate (TDP), the primary active form of vitamin B1.   Approximately 90 percent of vitamin B1 present in whole   blood is TDP. Thiamine and thiamine monophosphate, which   comprise the remaining 10 percent, are not measured.    This test was developed and its performance characteristics   determined by [facility]. It has not been cleared or   approved by the US Food and Drug Administration. This test   was performed in a CLIA certified laboratory and is   intended for clinical purposes.  Performed By: [facility]  [address] [place] [address]  : Colt Peoples MD, PhD  CLIA Number: [id number]   Ferritin   Result Value Ref Range    Ferritin 196 31 - 409 ng/mL   Iron and iron binding capacity   Result Value Ref Range    Iron 60 (L) 61 - 157 ug/dL    Iron Binding Capacity 278 240 - 430 ug/dL    Iron Sat Index 22 15 - 46 %   CBC with platelets   Result Value Ref Range    WBC Count 10.0 4.0 - 11.0 10e3/uL    RBC Count 5.24 4.40 - 5.90 10e6/uL    Hemoglobin 15.4 13.3 - 17.7 g/dL    Hematocrit 47.9 40.0 - 53.0 %    MCV 91 78 - 100 fL    MCH 29.4 26.5 - 33.0 pg    MCHC 32.2 31.5 - 36.5 g/dL    RDW 15.6 (H) 10.0 - 15.0 %    Platelet Count 245 150 - 450 10e3/uL   ESR: Erythrocyte sedimentation rate   Result Value Ref Range     Erythrocyte Sedimentation Rate 19 0 - 20 mm/hr   CRP, inflammation   Result Value Ref Range    CRP Inflammation 10.98 (H) <5.00 mg/L   Vitamin D Deficiency   Result Value Ref Range    Vitamin D, Total (25-Hydroxy) 7 (L) 20 - 50 ng/mL      Comment:      severe deficiency    Narrative    Season, race, dietary intake, and treatment affect the concentration of 25-hydroxy-Vitamin D. Values may decrease during winter months and increase during summer months.    Vitamin D determination is routinely performed by an immunoassay specific for 25 hydroxyvitamin D3.  If an individual is on vitamin D2(ergocalciferol) supplementation, please specify 25 OH vitamin D2 and D3 level determination by LCMSMS test VITD23.     Basic metabolic panel  (Ca, Cl, CO2, Creat, Gluc, K, Na, BUN)   Result Value Ref Range    Sodium 138 135 - 145 mmol/L      Comment:      Reference intervals for this test were updated on 09/26/2023 to more accurately reflect our healthy population. There may be differences in the flagging of prior results with similar values performed with this method. Interpretation of those prior results can be made in the context of the updated reference intervals.     Potassium 4.8 3.4 - 5.3 mmol/L    Chloride 102 98 - 107 mmol/L    Carbon Dioxide (CO2) 27 22 - 29 mmol/L    Anion Gap 9 7 - 15 mmol/L    Urea Nitrogen 5.1 (L) 6.0 - 20.0 mg/dL    Creatinine 0.78 0.67 - 1.17 mg/dL    GFR Estimate >90 >60 mL/min/1.73m2    Calcium 9.1 8.6 - 10.0 mg/dL    Glucose 84 70 - 99 mg/dL       If you have any questions or concerns, please call the clinic at the number listed above.       Sincerely,      LILLIAM Swartz CNP

## 2024-04-04 NOTE — LETTER
April 10, 2024      Adama Cid  [address] [address] [facility] [address]        Dear ,    We are writing to inform you of your test results.    Your vitamin D is very low. Recommend high dose supplement x 12 weeks and then recheck lab. I sent the Rx for the supplement to your pharmacy. The deficiency may be contributing to the numbness in the feet. The rest of the labs are normal.     Resulted Orders   TSH with free T4 reflex   Result Value Ref Range    TSH 3.88 0.30 - 4.20 uIU/mL   Vitamin B12   Result Value Ref Range    Vitamin B12 521 232 - 1,245 pg/mL   Folate   Result Value Ref Range    Folic Acid 8.5 4.6 - 34.8 ng/mL   Ferritin   Result Value Ref Range    Ferritin 196 31 - 409 ng/mL   Iron and iron binding capacity   Result Value Ref Range    Iron 60 (L) 61 - 157 ug/dL    Iron Binding Capacity 278 240 - 430 ug/dL    Iron Sat Index 22 15 - 46 %   CBC with platelets   Result Value Ref Range    WBC Count 10.0 4.0 - 11.0 10e3/uL    RBC Count 5.24 4.40 - 5.90 10e6/uL    Hemoglobin 15.4 13.3 - 17.7 g/dL    Hematocrit 47.9 40.0 - 53.0 %    MCV 91 78 - 100 fL    MCH 29.4 26.5 - 33.0 pg    MCHC 32.2 31.5 - 36.5 g/dL    RDW 15.6 (H) 10.0 - 15.0 %    Platelet Count 245 150 - 450 10e3/uL   ESR: Erythrocyte sedimentation rate   Result Value Ref Range    Erythrocyte Sedimentation Rate 19 0 - 20 mm/hr   CRP, inflammation   Result Value Ref Range    CRP Inflammation 10.98 (H) <5.00 mg/L   Vitamin D Deficiency   Result Value Ref Range    Vitamin D, Total (25-Hydroxy) 7 (L) 20 - 50 ng/mL      Comment:      severe deficiency    Narrative    Season, race, dietary intake, and treatment affect the concentration of 25-hydroxy-Vitamin D. Values may decrease during winter months and increase during summer months.    Vitamin D determination is routinely performed by an immunoassay specific for 25 hydroxyvitamin D3.  If an individual is on vitamin D2(ergocalciferol) supplementation, please specify 25 OH vitamin D2 and D3 level  determination by LCMSMS test VITD23.     Basic metabolic panel  (Ca, Cl, CO2, Creat, Gluc, K, Na, BUN)   Result Value Ref Range    Sodium 138 135 - 145 mmol/L      Comment:      Reference intervals for this test were updated on 09/26/2023 to more accurately reflect our healthy population. There may be differences in the flagging of prior results with similar values performed with this method. Interpretation of those prior results can be made in the context of the updated reference intervals.     Potassium 4.8 3.4 - 5.3 mmol/L    Chloride 102 98 - 107 mmol/L    Carbon Dioxide (CO2) 27 22 - 29 mmol/L    Anion Gap 9 7 - 15 mmol/L    Urea Nitrogen 5.1 (L) 6.0 - 20.0 mg/dL    Creatinine 0.78 0.67 - 1.17 mg/dL    GFR Estimate >90 >60 mL/min/1.73m2    Calcium 9.1 8.6 - 10.0 mg/dL    Glucose 84 70 - 99 mg/dL       If you have any questions or concerns, please call the clinic at the number listed above.       Sincerely,      LILLIAM Swartz CNP

## 2024-04-05 LAB
VIT B12 SERPL-MCNC: 521 PG/ML (ref 232–1245)
VIT D+METAB SERPL-MCNC: 7 NG/ML (ref 20–50)

## 2024-04-07 NOTE — RESULT ENCOUNTER NOTE
Please call:    Your vitamin D is very low. Recommend high dose supplement x 12 weeks and then recheck lab. I sent the Rx for the supplement to your pharmacy. The deficiency may be contributing to the numbness in the feet. The rest of the labs are normal.     Please also mail copy of labs for his records. Thank you itching, swelling/REDNESS

## 2024-04-08 NOTE — RESULT ENCOUNTER NOTE
Attempted to call patient. Unable to leave a message. Will try again at a later time.     Krista Oseguera, CMA

## 2024-04-09 LAB — VIT B1 PYROPHOSHATE BLD-SCNC: 167 NMOL/L

## 2024-04-10 NOTE — RESULT ENCOUNTER NOTE
Please send patient letter notifying of labs and provider message.    Normal thiamine level.    Thanks,  LILLIAM Martinez CNP

## 2024-04-30 ENCOUNTER — TELEPHONE (OUTPATIENT)
Dept: NEUROLOGY | Facility: CLINIC | Age: 59
End: 2024-04-30

## 2024-05-14 ENCOUNTER — TELEPHONE (OUTPATIENT)
Dept: NEUROLOGY | Facility: CLINIC | Age: 59
End: 2024-05-14

## 2024-05-28 ENCOUNTER — TELEPHONE (OUTPATIENT)
Dept: NEUROLOGY | Facility: CLINIC | Age: 59
End: 2024-05-28

## 2024-05-28 NOTE — TELEPHONE ENCOUNTER
Reached out to patient to schedule New Seizure per referral received 04/04/2024. No answer, left detailed message for a call back.

## 2024-07-12 NOTE — NURSING NOTE
Rheumatological History:  Spondyloarthropathy dx 4/13/10  Previous Rheumatologist(s):   Dr. Jameson (LOV 6/6/12  Dr. Webb    Current Treatment(s):   Naproxen as needed    Past Treatment(s):  Diclofenac  Ibuprofen  Osteo Bi-flex  Prednisone  Piroxicam

## 2024-07-12 NOTE — PROGRESS NOTES
Rheumatology Clinic Visit  [facility]  MEJIA Lindsey MRN# [id number]   YOB: 1965 Age: 59 year old   Date of Visit: 7/16/2024  Primary care provider: Hernan Connell          Assessment and Plan:     1.  Ankylosing spondylitis of multiple sites  2.  High-risk medication use  3.  Peripheral neuropathy  4.  Skin rash    Patient presents today to establish care for his ankylosing spondylitis.  He was initially diagnosed in 2010.  He last saw a rheumatology in 2012.  In the past he has been recommended to try diclofenac and piroxicam.  He did not try the diclofenac due to financial constraints.  He is unsure if he tried the piroxicam.  He has tried ibuprofen and Aleve.  Most recently he was admitted to the hospital in March of this year due to weakness requiring the assistance of 2 in order to stand.  Upon discharge it was recommended that he reestablish care with rheumatology to get treatment for his ankylosing spondylitis.  He states that he does get a lot of pain in his back which restricts his movement.  He does also get pain in his hands and in his wrists.  Physical examination today does show contractures of the left and right hand.  There does also appear to be a flexion deformity of the right fifth PIP but this could also be related to a contracture.  We did discuss different treatment options including biologic therapy versus oral therapy.  At this time he would like to try the oral medications.  We will start him on sulfasalazine.  Side effects of this medication were reviewed.  Due to his alcohol intake methotrexate  and leflunomide would not be recommended future treatment options.  Referrals were placed for the peripheral neuropathy as well as the skin rash on his face.  He will follow-up with me in 3 months.  Will check labs today and then again in 1 month.  Will get x-rays of his hands and of his wrist today.    The longitudinal plan of care for the  diagnosis(es)/condition(s) as documented were addressed during this visit. Due to the added complexity in care, I will continue to support Adama in the subsequent management and with ongoing continuity of care.    Plan:     Schedule follow-up with Silvia Reyna PA-C in 3 months.   Imaging: xray hands and wrists  Labs: CBC, creatinine, Albumin, AST, ALT, CRP and Sed Rate today and again in 1 month;   Medication recommendations:   Start Sulfasalazine 500mg: Take 1 tablet daily for 1 week, then take 1 tablet twice daily for 1 week, then take 1 tab in AM and 2 tabs in PM for 1 week, then take 2 tabs twice daily. Take this medication with food.  # Sulfasalazine Risks and Benefits: The risks and benefits of sulfasalazine were discussed in detail and the patient verbalized understanding.  The risks discussed include, but are not limited to, the risk for hypersensitivity, anaphylaxis, anaphylactoid reactions, infections, bone marrow suppression,  hepatotoxicity, nausea, vomiting, and GI upset.  Oligospermia may occur in males.  I encouraged reviewing the package insert and asking any questions about the medication.     Other: referral to dermatology and neurology    Silvia Reyna, [facility]  Rheumatology         History of Present Illness:   Adama Cid presents for evaluation of ankylosing spondylitis.  Patient was admitted on 3/27/2024 for weakness which required the assistance of 2 in the emergency room to stand up.  This was in the setting of a recent unwitnessed fall and likely seizure, possibly attributable to alcohol withdrawal.      Patient seen rheumatology in the past with last visit in 2012 with Dr. Jameson.  Per that visit he is HLA-B27 positive with ankylosing spondylitis.  He was diagnosed in 2010 with a lumbar spine MRI showing possibility of early ankylosing spondylosis with marrow edema in the anterior inferior corner of the T12 vertebral body and anterior superior corner of the L1 vertebral body.  Similar  "changes to a lesser extent were also present at L1-L2.  Nonsteroidal therapy was recommended.  He had persistent symptoms with ibuprofen, therefore diclofenac was recommended.  He was unable to continue to afford the diclofenac and omeprazole.  He had then been given a course of prednisone 40 mg daily for 5 days but was discontinued after 2 days as it was associated with a sense of depression.  He was given a prescription for piroxicam 20 mg daily at his last rheumatology visit. He is not sure if he took this.     Currently he is unable to bend over. His pain is located in the mid back. Movement does help. His biggest issues is bending over. This is due to both pain and feeling that his back is restricted. He will get to a certain point and he will feel like he is going to \"black out\". No fevers, No infections. He has a lot of pain in his hands, particularly his fingers. He has had changes to fingers, that has progressed over the last couple years. He has a skin rash on his face. He has not seen dermatology before.     He states that he has been having numbness in his feet as well. He has had this for quite awhile. He gets cramps in his legs from his knees down. He states that his balance is poor due to this.          Review of Systems:     Constitutional: negative  Skin: negative  Eyes: negative  Ears/Nose/Throat: negative  Respiratory: No shortness of breath, dyspnea on exertion, cough, or hemoptysis  Cardiovascular: negative  Gastrointestinal: negative  Genitourinary: negative  Musculoskeletal: as above  Neurologic: negative  Psychiatric: negative  Hematologic/Lymphatic/Immunologic: negative  Endocrine: negative         Active Problem List:     Patient Active Problem List    Diagnosis Date Noted    Numbness in feet 04/04/2024     Priority: Medium    Seizure (H) 03/27/2024     Priority: Medium    Ankylosing spondylitis of sacral region (H) 11/01/2021     Priority: Medium    Hypertension 06/06/2017     Priority: " Medium    Bilateral carpal tunnel syndrome 05/01/2017     Priority: Medium    Periapical abscess without sinus 06/22/2013     Priority: Medium    CARDIOVASCULAR SCREENING; LDL GOAL LESS THAN 160 10/31/2010     Priority: Medium    Carpal tunnel syndrome 04/13/2010     Priority: Medium            Past Medical History:     Past Medical History:   Diagnosis Date    Hypertension 6/6/2017     Past Surgical History:   Procedure Laterality Date    ZZC TOTAL KNEE ARTHROPLASTY              Social History:     Social History     Socioeconomic History    Marital status:      Spouse name: Not on file    Number of children: Not on file    Years of education: Not on file    Highest education level: Not on file   Occupational History    Not on file   Tobacco Use    Smoking status: Every Day     Current packs/day: 1.50     Types: Cigarettes     Passive exposure: Current    Smokeless tobacco: Never    Tobacco comments:     1-1.5 ppd.   Vaping Use    Vaping status: Never Used   Substance and Sexual Activity    Alcohol use: Yes     Comment: occ.    Drug use: No    Sexual activity: Yes     Partners: Female   Other Topics Concern    Parent/sibling w/ CABG, MI or angioplasty before 65F 55M? No   Social History Narrative    Not on file     Social Determinants of Health     Financial Resource Strain: Low Risk  (4/4/2024)    Financial Resource Strain     Within the past 12 months, have you or your family members you live with been unable to get utilities (heat, electricity) when it was really needed?: No   Food Insecurity: Low Risk  (4/4/2024)    Food Insecurity     Within the past 12 months, did you worry that your food would run out before you got money to buy more?: No     Within the past 12 months, did the food you bought just not last and you didn t have money to get more?: No   Transportation Needs: Low Risk  (4/4/2024)    Transportation Needs     Within the past 12 months, has lack of transportation kept you from medical  "appointments, getting your medicines, non-medical meetings or appointments, work, or from getting things that you need?: No   Physical Activity: Not on file   Stress: Not on file   Social Connections: Not on file   Interpersonal Safety: Not on file   Housing Stability: Low Risk  (4/4/2024)    Housing Stability     Do you have housing? : Yes     Are you worried about losing your housing?: No          Family History:   History reviewed. No pertinent family history.         Allergies:   No Known Allergies         Medications:     Current Outpatient Medications   Medication Sig Dispense Refill    calcium carbonate (OS-SEBASTIÁN) 1500 (600 Ca) MG tablet Take 1 tablet (600 mg) by mouth 2 times daily (with meals) 180 tablet 3    naproxen sodium (ALEVE) 220 MG capsule Take 220 mg by mouth daily as needed.      sulfaSALAzine ER (AZULFIDINE EN) 500 MG EC tablet Take 1 tablet daily for 1 week, then take 1 tablet twice daily for 1 week, then take 1 tab in AM and 2 tabs in PM for 1 week, then take 2 tabs twice daily. Take this medication with food. Labs every 8-12 weeks. 120 tablet 2            Physical Exam:   Blood pressure (!) 147/84, pulse 86, resp. rate 20, height 1.803 m (5' 11\"), weight 99.8 kg (220 lb), SpO2 98%.  Wt Readings from Last 6 Encounters:   07/16/24 99.8 kg (220 lb)   04/04/24 97.1 kg (214 lb)   03/26/24 99.8 kg (220 lb)   11/01/21 97.9 kg (215 lb 12.8 oz)   02/24/21 101 kg (222 lb 9.6 oz)   06/06/12 101.2 kg (223 lb)     Constitutional: well-developed, appearing stated age; cooperative  Eyes: nl conjunctiva, sclera  ENT: nl external ears, nose, hearing, lips  Neck: no visible mass or thyroid enlargement  Resp: No shortness of breath with normal conversation  Lymph: no cervical, supraclavicular or epitrochlear nodes  MS: Contractures in both hands.  Flexion deformity of the right fifth PIP but this could be related to a contracture.  Skin: Red pimply rash on his face.  Psych: nl judgement, orientation, memory, " affect.           Data:   Imaging:  MRI lumbar Spine 03/12/2021  IMPRESSION:       1. Mild degenerative change.  2. No high-grade stenoses.  3. Interval fusion across the right sacroiliac joint  4. Evolution of marrow signal changes at T11-T12.  5. Ankylosing spondylitis cannot be excluded. Further workup could be  pursued.    Laboratory:  4/4/2024  Creatinine 0.78, GFR greater than 90  CRP 10.98  White blood cell count 10.0, hemoglobin 15.4, platelet count 245  Sed rate 19

## 2024-07-16 ENCOUNTER — ANCILLARY PROCEDURE (OUTPATIENT)
Dept: GENERAL RADIOLOGY | Facility: CLINIC | Age: 59
End: 2024-07-16
Attending: PHYSICIAN ASSISTANT
Payer: COMMERCIAL

## 2024-07-16 ENCOUNTER — OFFICE VISIT (OUTPATIENT)
Dept: RHEUMATOLOGY | Facility: CLINIC | Age: 59
End: 2024-07-16
Payer: COMMERCIAL

## 2024-07-16 VITALS
DIASTOLIC BLOOD PRESSURE: 84 MMHG | HEIGHT: 71 IN | WEIGHT: 220 LBS | BODY MASS INDEX: 30.8 KG/M2 | OXYGEN SATURATION: 98 % | SYSTOLIC BLOOD PRESSURE: 147 MMHG | RESPIRATION RATE: 20 BRPM | HEART RATE: 86 BPM

## 2024-07-16 DIAGNOSIS — Z79.899 HIGH RISK MEDICATION USE: ICD-10-CM

## 2024-07-16 DIAGNOSIS — M45.0 ANKYLOSING SPONDYLITIS OF MULTIPLE SITES IN SPINE (H): Primary | ICD-10-CM

## 2024-07-16 DIAGNOSIS — M45.0 ANKYLOSING SPONDYLITIS OF MULTIPLE SITES IN SPINE (H): ICD-10-CM

## 2024-07-16 DIAGNOSIS — R21 SKIN RASH: ICD-10-CM

## 2024-07-16 DIAGNOSIS — E55.9 VITAMIN D DEFICIENCY: ICD-10-CM

## 2024-07-16 DIAGNOSIS — G62.9 PERIPHERAL POLYNEUROPATHY: ICD-10-CM

## 2024-07-16 LAB
ALBUMIN SERPL BCG-MCNC: 3.8 G/DL (ref 3.5–5.2)
ALT SERPL W P-5'-P-CCNC: 41 U/L (ref 0–70)
AST SERPL W P-5'-P-CCNC: 32 U/L (ref 0–45)
CREAT SERPL-MCNC: 0.85 MG/DL (ref 0.67–1.17)
CRP SERPL-MCNC: 9.83 MG/L
EGFRCR SERPLBLD CKD-EPI 2021: >90 ML/MIN/1.73M2
ERYTHROCYTE [DISTWIDTH] IN BLOOD BY AUTOMATED COUNT: 13.8 % (ref 10–15)
ERYTHROCYTE [SEDIMENTATION RATE] IN BLOOD BY WESTERGREN METHOD: 10 MM/HR (ref 0–20)
HCT VFR BLD AUTO: 50.4 % (ref 40–53)
HGB BLD-MCNC: 16 G/DL (ref 13.3–17.7)
MCH RBC QN AUTO: 29.8 PG (ref 26.5–33)
MCHC RBC AUTO-ENTMCNC: 31.7 G/DL (ref 31.5–36.5)
MCV RBC AUTO: 94 FL (ref 78–100)
PLATELET # BLD AUTO: 224 10E3/UL (ref 150–450)
RBC # BLD AUTO: 5.37 10E6/UL (ref 4.4–5.9)
VIT D+METAB SERPL-MCNC: 26 NG/ML (ref 20–50)
WBC # BLD AUTO: 11.6 10E3/UL (ref 4–11)

## 2024-07-16 PROCEDURE — 82306 VITAMIN D 25 HYDROXY: CPT | Performed by: PHYSICIAN ASSISTANT

## 2024-07-16 PROCEDURE — G2211 COMPLEX E/M VISIT ADD ON: HCPCS | Performed by: PHYSICIAN ASSISTANT

## 2024-07-16 PROCEDURE — 73110 X-RAY EXAM OF WRIST: CPT | Mod: TC | Performed by: RADIOLOGY

## 2024-07-16 PROCEDURE — 82565 ASSAY OF CREATININE: CPT | Performed by: PHYSICIAN ASSISTANT

## 2024-07-16 PROCEDURE — 73130 X-RAY EXAM OF HAND: CPT | Mod: TC | Performed by: RADIOLOGY

## 2024-07-16 PROCEDURE — 85027 COMPLETE CBC AUTOMATED: CPT | Performed by: PHYSICIAN ASSISTANT

## 2024-07-16 PROCEDURE — 36415 COLL VENOUS BLD VENIPUNCTURE: CPT | Performed by: PHYSICIAN ASSISTANT

## 2024-07-16 PROCEDURE — 85652 RBC SED RATE AUTOMATED: CPT | Performed by: PHYSICIAN ASSISTANT

## 2024-07-16 PROCEDURE — 84450 TRANSFERASE (AST) (SGOT): CPT | Performed by: PHYSICIAN ASSISTANT

## 2024-07-16 PROCEDURE — 84460 ALANINE AMINO (ALT) (SGPT): CPT | Performed by: PHYSICIAN ASSISTANT

## 2024-07-16 PROCEDURE — 99214 OFFICE O/P EST MOD 30 MIN: CPT | Performed by: PHYSICIAN ASSISTANT

## 2024-07-16 PROCEDURE — 82040 ASSAY OF SERUM ALBUMIN: CPT | Performed by: PHYSICIAN ASSISTANT

## 2024-07-16 PROCEDURE — 86140 C-REACTIVE PROTEIN: CPT | Performed by: PHYSICIAN ASSISTANT

## 2024-07-16 RX ORDER — SULFASALAZINE 500 MG/1
TABLET, DELAYED RELEASE ORAL
Qty: 120 TABLET | Refills: 2 | Status: SHIPPED | OUTPATIENT
Start: 2024-07-16

## 2024-07-16 ASSESSMENT — PAIN SCALES - GENERAL: PAINLEVEL: SEVERE PAIN (7)

## 2024-07-16 NOTE — PATIENT INSTRUCTIONS
After Visit Instructions:     Thank you for coming to [facility] for your care. It is my goal to partner with you to help you reach your optimal state of health.       Plan:     Schedule follow-up with Silvia Reyna PA-C in 3 months.   Imaging: xray hands and wrists  Labs: CBC, creatinine, Albumin, AST, ALT, CRP and Sed Rate today and again in 1 month;   Medication recommendations:   Start Sulfasalazine 500mg: Take 1 tablet daily for 1 week, then take 1 tablet twice daily for 1 week, then take 1 tab in AM and 2 tabs in PM for 1 week, then take 2 tabs twice daily. Take this medication with food.  # Sulfasalazine Risks and Benefits: The risks and benefits of sulfasalazine were discussed in detail and the patient verbalized understanding.  The risks discussed include, but are not limited to, the risk for hypersensitivity, anaphylaxis, anaphylactoid reactions, infections, bone marrow suppression,  hepatotoxicity, nausea, vomiting, and GI upset.  Oligospermia may occur in males.  I encouraged reviewing the package insert and asking any questions about the medication.     Other: referral to dermatology and neurology      Silvia Reyna PA-C  [facility]  [facility]    Contact information: [facility]  Clinic Number:  [phone]  Please call or send a [email or web address] message with any questions about your care

## 2024-07-16 NOTE — LETTER
July 18, 2024      Adama Cid  [id number] [facility] [id number]        Dear Adama,    We are writing to inform you of your test results.    Your vitamin D is I normal range. Continue supplement at current dose.     Resulted Orders   Vitamin D Deficiency   Result Value Ref Range    Vitamin D, Total (25-Hydroxy) 26 20 - 50 ng/mL      Comment:      optimum levels    Narrative    Season, race, dietary intake, and treatment affect the concentration of 25-hydroxy-Vitamin D. Values may decrease during winter months and increase during summer months.    Vitamin D determination is routinely performed by an immunoassay specific for 25 hydroxyvitamin D3.  If an individual is on vitamin D2(ergocalciferol) supplementation, please specify 25 OH vitamin D2 and D3 level determination by LCMSMS test VITD23.         If you have any questions or concerns, please call the clinic at the number listed above.       Sincerely,      LILLIAM Swartz CNP

## 2024-07-18 NOTE — RESULT ENCOUNTER NOTE
Please send letter;    Hello Adama    Your vitamin D is I normal range. Continue supplement at current dose. Please let us know if you have any questions.     Take care,    LILLIAM Martinez CNP